# Patient Record
Sex: FEMALE | Race: WHITE | NOT HISPANIC OR LATINO
[De-identification: names, ages, dates, MRNs, and addresses within clinical notes are randomized per-mention and may not be internally consistent; named-entity substitution may affect disease eponyms.]

---

## 2021-01-18 ENCOUNTER — NON-APPOINTMENT (OUTPATIENT)
Age: 81
End: 2021-01-18

## 2021-01-18 ENCOUNTER — APPOINTMENT (OUTPATIENT)
Dept: CARDIOLOGY | Facility: CLINIC | Age: 81
End: 2021-01-18
Payer: MEDICARE

## 2021-01-18 ENCOUNTER — LABORATORY RESULT (OUTPATIENT)
Age: 81
End: 2021-01-18

## 2021-01-18 VITALS
WEIGHT: 150 LBS | BODY MASS INDEX: 27.6 KG/M2 | SYSTOLIC BLOOD PRESSURE: 180 MMHG | TEMPERATURE: 96.9 F | OXYGEN SATURATION: 99 % | HEART RATE: 65 BPM | HEIGHT: 62 IN | DIASTOLIC BLOOD PRESSURE: 73 MMHG | RESPIRATION RATE: 18 BRPM

## 2021-01-18 VITALS — SYSTOLIC BLOOD PRESSURE: 210 MMHG | DIASTOLIC BLOOD PRESSURE: 90 MMHG

## 2021-01-18 DIAGNOSIS — Z78.9 OTHER SPECIFIED HEALTH STATUS: ICD-10-CM

## 2021-01-18 DIAGNOSIS — R42 DIZZINESS AND GIDDINESS: ICD-10-CM

## 2021-01-18 DIAGNOSIS — Z00.00 ENCOUNTER FOR GENERAL ADULT MEDICAL EXAMINATION W/OUT ABNORMAL FINDINGS: ICD-10-CM

## 2021-01-18 PROCEDURE — 93000 ELECTROCARDIOGRAM COMPLETE: CPT

## 2021-01-18 PROCEDURE — 99205 OFFICE O/P NEW HI 60 MIN: CPT

## 2021-01-18 PROCEDURE — 93306 TTE W/DOPPLER COMPLETE: CPT

## 2021-01-18 RX ORDER — METOPROLOL SUCCINATE 25 MG/1
25 TABLET, EXTENDED RELEASE ORAL
Qty: 30 | Refills: 0 | Status: DISCONTINUED | COMMUNITY
Start: 2021-01-05 | End: 2021-01-18

## 2021-01-18 RX ORDER — DIAZEPAM 5 MG/1
5 TABLET ORAL
Qty: 60 | Refills: 0 | Status: ACTIVE | COMMUNITY
Start: 2021-01-05

## 2021-01-18 RX ORDER — LEVOTHYROXINE SODIUM 0.05 MG/1
50 TABLET ORAL
Qty: 90 | Refills: 0 | Status: ACTIVE | COMMUNITY
Start: 2020-11-09

## 2021-01-18 NOTE — PHYSICAL EXAM
[Normal Appearance] : normal appearance [General Appearance - Well Developed] : well developed [Well Groomed] : well groomed [General Appearance - Well Nourished] : well nourished [No Deformities] : no deformities [General Appearance - In No Acute Distress] : no acute distress [Eyelids - No Xanthelasma] : the eyelids demonstrated no xanthelasmas [Normal Conjunctiva] : the conjunctiva exhibited no abnormalities [Heart Rate And Rhythm] : heart rate and rhythm were normal [Heart Sounds] : normal S1 and S2 [Murmurs] : no murmurs present [Arterial Pulses Normal] : the arterial pulses were normal [Edema] : no peripheral edema present [Exaggerated Use Of Accessory Muscles For Inspiration] : no accessory muscle use [Respiration, Rhythm And Depth] : normal respiratory rhythm and effort [Auscultation Breath Sounds / Voice Sounds] : lungs were clear to auscultation bilaterally [Abdomen Soft] : soft [Abdomen Tenderness] : non-tender [Abdomen Mass (___ Cm)] : no abdominal mass palpated [Abnormal Walk] : normal gait [Gait - Sufficient For Exercise Testing] : the gait was sufficient for exercise testing [Nail Clubbing] : no clubbing of the fingernails [Petechial Hemorrhages (___cm)] : no petechial hemorrhages [Cyanosis, Localized] : no localized cyanosis [Skin Turgor] : normal skin turgor [] : no rash [Affect] : the affect was normal [Mood] : the mood was normal [FreeTextEntry1] : no bruits

## 2021-01-18 NOTE — REVIEW OF SYSTEMS
[Eyeglasses] : currently wearing eyeglasses [Dizziness] : dizziness [Negative] : Heme/Lymph [FreeTextEntry1] : falls

## 2021-01-18 NOTE — DISCUSSION/SUMMARY
[Patient] : the patient [Risks] : risks [Benefits] : benefits [Alternatives] : alternatives [___ Week(s)] : [unfilled] week(s) [FreeTextEntry1] : When bp controlled, consider for ischemia w/u as well.

## 2021-01-18 NOTE — ASSESSMENT
[FreeTextEntry1] : HBP labile with suboptimal control. r/o secondary hypertension\par LBBB\par Thyroid disease\par Anxiety

## 2021-01-18 NOTE — HISTORY OF PRESENT ILLNESS
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

## 2021-01-19 LAB
ALBUMIN SERPL ELPH-MCNC: 4.7 G/DL
ALDOSTERONE SERUM: 6.2 NG/DL
ALP BLD-CCNC: 74 U/L
ALT SERPL-CCNC: 19 U/L
ANION GAP SERPL CALC-SCNC: 14 MMOL/L
AST SERPL-CCNC: 26 U/L
BASOPHILS # BLD AUTO: 0.04 K/UL
BASOPHILS NFR BLD AUTO: 0.7 %
BILIRUB SERPL-MCNC: 0.8 MG/DL
BUN SERPL-MCNC: 12 MG/DL
CALCIUM SERPL-MCNC: 9.7 MG/DL
CHLORIDE SERPL-SCNC: 99 MMOL/L
CHOLEST SERPL-MCNC: 159 MG/DL
CO2 SERPL-SCNC: 26 MMOL/L
CREAT SERPL-MCNC: 0.67 MG/DL
EOSINOPHIL # BLD AUTO: 0.06 K/UL
EOSINOPHIL NFR BLD AUTO: 1 %
GLUCOSE SERPL-MCNC: 89 MG/DL
HCT VFR BLD CALC: 39.5 %
HDLC SERPL-MCNC: 77 MG/DL
HGB BLD-MCNC: 12.6 G/DL
IMM GRANULOCYTES NFR BLD AUTO: 0.5 %
LDLC SERPL CALC-MCNC: 71 MG/DL
LYMPHOCYTES # BLD AUTO: 2.16 K/UL
LYMPHOCYTES NFR BLD AUTO: 36.1 %
MAN DIFF?: NORMAL
MCHC RBC-ENTMCNC: 29.8 PG
MCHC RBC-ENTMCNC: 31.9 GM/DL
MCV RBC AUTO: 93.4 FL
MONOCYTES # BLD AUTO: 0.4 K/UL
MONOCYTES NFR BLD AUTO: 6.7 %
NEUTROPHILS # BLD AUTO: 3.3 K/UL
NEUTROPHILS NFR BLD AUTO: 55 %
NONHDLC SERPL-MCNC: 82 MG/DL
PLATELET # BLD AUTO: 205 K/UL
POTASSIUM SERPL-SCNC: 5 MMOL/L
PROT SERPL-MCNC: 7.6 G/DL
RBC # BLD: 4.23 M/UL
RBC # FLD: 12.6 %
SODIUM SERPL-SCNC: 139 MMOL/L
T3FREE SERPL-MCNC: 2.54 PG/ML
T4 FREE SERPL-MCNC: 1.5 NG/DL
TRIGL SERPL-MCNC: 55 MG/DL
TSH SERPL-ACNC: 1.12 UIU/ML
WBC # FLD AUTO: 5.99 K/UL

## 2021-01-20 LAB — VMA/CREAT UR: 6.3 MG/G CR

## 2021-01-24 LAB
CREAT ?TM UR-MCNC: 27 MG/DL
DOPAMINE/CREAT UR: 186 MCG/G CR
EPINEPH/CREAT UR: 19 MCG/G CR
EPINEPHRINE/NOEPINEPHRINE CALC TOTAL RAN: 125 MCG/G CR
NOREPINEPH/CREAT UR: 106 MCG/G CR

## 2021-01-24 RX ORDER — METOPROLOL SUCCINATE 25 MG/1
25 TABLET, EXTENDED RELEASE ORAL
Refills: 0 | Status: DISCONTINUED | COMMUNITY
End: 2021-01-24

## 2021-01-26 LAB — RENIN ACTIVITY, PLASMA: 0.49 NG/ML/HR

## 2021-02-09 ENCOUNTER — APPOINTMENT (OUTPATIENT)
Dept: CARDIOLOGY | Facility: CLINIC | Age: 81
End: 2021-02-09

## 2021-02-11 ENCOUNTER — APPOINTMENT (OUTPATIENT)
Dept: CARDIOLOGY | Facility: CLINIC | Age: 81
End: 2021-02-11

## 2021-02-23 ENCOUNTER — APPOINTMENT (OUTPATIENT)
Dept: CARDIOLOGY | Facility: CLINIC | Age: 81
End: 2021-02-23
Payer: MEDICARE

## 2021-02-23 PROCEDURE — 93975 VASCULAR STUDY: CPT

## 2021-03-01 ENCOUNTER — APPOINTMENT (OUTPATIENT)
Dept: CARDIOLOGY | Facility: CLINIC | Age: 81
End: 2021-03-01
Payer: MEDICARE

## 2021-03-01 ENCOUNTER — NON-APPOINTMENT (OUTPATIENT)
Age: 81
End: 2021-03-01

## 2021-03-01 VITALS
HEART RATE: 53 BPM | HEIGHT: 62 IN | TEMPERATURE: 97.5 F | DIASTOLIC BLOOD PRESSURE: 81 MMHG | BODY MASS INDEX: 27.6 KG/M2 | RESPIRATION RATE: 16 BRPM | OXYGEN SATURATION: 100 % | WEIGHT: 150 LBS | SYSTOLIC BLOOD PRESSURE: 154 MMHG

## 2021-03-01 PROCEDURE — 99214 OFFICE O/P EST MOD 30 MIN: CPT

## 2021-03-01 PROCEDURE — 93000 ELECTROCARDIOGRAM COMPLETE: CPT

## 2021-03-01 NOTE — DISCUSSION/SUMMARY
[Patient] : the patient [Risks] : risks [Benefits] : benefits [Alternatives] : alternatives [___ Week(s)] : [unfilled] week(s) [FreeTextEntry1] : Discussed extensively with patient reviewed lab studies renal artery duplex.  Will not increase beta-blocker related to resting bradycardia and good control of blood pressure on home measurements.  Continue same cardiac meds at this time.  Advised 24-hour urine for catecholamines.  Advised ischemia work-up but she declines at this time asymptomatic and physically active.  Follow with her PMD and may see me once or twice a year.

## 2021-03-01 NOTE — PHYSICAL EXAM
[General Appearance - Well Developed] : well developed [Normal Appearance] : normal appearance [Well Groomed] : well groomed [General Appearance - Well Nourished] : well nourished [No Deformities] : no deformities [General Appearance - In No Acute Distress] : no acute distress [Normal Conjunctiva] : the conjunctiva exhibited no abnormalities [Eyelids - No Xanthelasma] : the eyelids demonstrated no xanthelasmas [Respiration, Rhythm And Depth] : normal respiratory rhythm and effort [Exaggerated Use Of Accessory Muscles For Inspiration] : no accessory muscle use [Auscultation Breath Sounds / Voice Sounds] : lungs were clear to auscultation bilaterally [Heart Rate And Rhythm] : heart rate and rhythm were normal [Heart Sounds] : normal S1 and S2 [Murmurs] : no murmurs present [Arterial Pulses Normal] : the arterial pulses were normal [Edema] : no peripheral edema present [Abdomen Soft] : soft [Abdomen Tenderness] : non-tender [Abdomen Mass (___ Cm)] : no abdominal mass palpated [Abnormal Walk] : normal gait [Gait - Sufficient For Exercise Testing] : the gait was sufficient for exercise testing [Nail Clubbing] : no clubbing of the fingernails [Cyanosis, Localized] : no localized cyanosis [Petechial Hemorrhages (___cm)] : no petechial hemorrhages [Skin Turgor] : normal skin turgor [] : no rash [Affect] : the affect was normal [Mood] : the mood was normal [FreeTextEntry1] : no bruits

## 2021-03-01 NOTE — REVIEW OF SYSTEMS
[Eyeglasses] : currently wearing eyeglasses [Under Stress] : under stress [Negative] : Heme/Lymph [Dizziness] : no dizziness [FreeTextEntry1] : falls

## 2021-03-01 NOTE — ASSESSMENT
[FreeTextEntry1] : Improved blood pressure control.  Elevated catecholamine screening examination.  LBBB.

## 2021-05-13 ENCOUNTER — APPOINTMENT (OUTPATIENT)
Dept: CARDIOLOGY | Facility: CLINIC | Age: 81
End: 2021-05-13
Payer: MEDICARE

## 2021-05-13 VITALS
WEIGHT: 150 LBS | DIASTOLIC BLOOD PRESSURE: 86 MMHG | TEMPERATURE: 98 F | OXYGEN SATURATION: 100 % | BODY MASS INDEX: 27.6 KG/M2 | RESPIRATION RATE: 16 BRPM | SYSTOLIC BLOOD PRESSURE: 161 MMHG | HEIGHT: 62 IN | HEART RATE: 76 BPM

## 2021-05-13 VITALS — DIASTOLIC BLOOD PRESSURE: 86 MMHG | SYSTOLIC BLOOD PRESSURE: 146 MMHG

## 2021-05-13 PROCEDURE — 99213 OFFICE O/P EST LOW 20 MIN: CPT

## 2021-05-13 PROCEDURE — 93000 ELECTROCARDIOGRAM COMPLETE: CPT

## 2021-05-13 NOTE — PHYSICAL EXAM
[Well Developed] : well developed [Well Nourished] : well nourished [No Acute Distress] : no acute distress [Normal Conjunctiva] : normal conjunctiva [Normal Venous Pressure] : normal venous pressure [No Carotid Bruit] : no carotid bruit [Normal S1, S2] : normal S1, S2 [No Rub] : no rub [No Gallop] : no gallop [Normal] : clear lung fields, good air entry, no respiratory distress [Clear Lung Fields] : clear lung fields [Good Air Entry] : good air entry [No Respiratory Distress] : no respiratory distress  [Soft] : abdomen soft [Non Tender] : non-tender [No Masses/organomegaly] : no masses/organomegaly [Normal Bowel Sounds] : normal bowel sounds [Normal Gait] : normal gait [No Edema] : no edema [No Cyanosis] : no cyanosis [No Clubbing] : no clubbing [No Varicosities] : no varicosities [No Rash] : no rash [No Skin Lesions] : no skin lesions [Moves all extremities] : moves all extremities [No Focal Deficits] : no focal deficits [Normal Speech] : normal speech [Alert and Oriented] : alert and oriented [Normal memory] : normal memory [de-identified] : Soft systolic murmur at base

## 2021-05-13 NOTE — DISCUSSION/SUMMARY
[Patient] : the patient [Risks] : risks [Benefits] : benefits [Alternatives] : alternatives [___ Month(s)] : in [unfilled] month(s) [FreeTextEntry1] : Discussed with patient in detail as addressed.  Possible precautions and hydration discussed.  Continue current cardiac medicines.  Follow-up with me in 6 months earlier as needed.

## 2021-05-13 NOTE — HISTORY OF PRESENT ILLNESS
[FreeTextEntry1] : Follow-up visit.  Review of home blood pressure generally between the 120s and 130s systolic over 60s.  Has a little bit of postural lightheadedness at times.  No chest pain shortness of breath flushing palpitations.\par \par Reviewed urinary test results with her.

## 2021-07-18 ENCOUNTER — INPATIENT (INPATIENT)
Facility: HOSPITAL | Age: 81
LOS: 0 days | Discharge: HOME | DRG: 394 | End: 2021-07-19
Attending: STUDENT IN AN ORGANIZED HEALTH CARE EDUCATION/TRAINING PROGRAM | Admitting: STUDENT IN AN ORGANIZED HEALTH CARE EDUCATION/TRAINING PROGRAM
Payer: COMMERCIAL

## 2021-07-18 ENCOUNTER — TRANSCRIPTION ENCOUNTER (OUTPATIENT)
Age: 81
End: 2021-07-18

## 2021-07-18 VITALS
RESPIRATION RATE: 15 BRPM | TEMPERATURE: 98 F | DIASTOLIC BLOOD PRESSURE: 70 MMHG | SYSTOLIC BLOOD PRESSURE: 150 MMHG | HEART RATE: 89 BPM | WEIGHT: 147.93 LBS | HEIGHT: 62 IN | OXYGEN SATURATION: 97 %

## 2021-07-18 DIAGNOSIS — K35.80 UNSPECIFIED ACUTE APPENDICITIS: ICD-10-CM

## 2021-07-18 LAB
ALBUMIN SERPL ELPH-MCNC: 3.7 G/DL — SIGNIFICANT CHANGE UP (ref 3.3–5)
ALP SERPL-CCNC: 56 U/L — SIGNIFICANT CHANGE UP (ref 40–120)
ALT FLD-CCNC: 33 U/L — SIGNIFICANT CHANGE UP (ref 10–45)
ANION GAP SERPL CALC-SCNC: 3 MMOL/L — LOW (ref 5–17)
APPEARANCE UR: CLEAR — SIGNIFICANT CHANGE UP
AST SERPL-CCNC: 24 U/L — SIGNIFICANT CHANGE UP (ref 10–40)
BACTERIA # UR AUTO: NEGATIVE /HPF — SIGNIFICANT CHANGE UP
BASOPHILS # BLD AUTO: 0.02 K/UL — SIGNIFICANT CHANGE UP (ref 0–0.2)
BASOPHILS NFR BLD AUTO: 0.2 % — SIGNIFICANT CHANGE UP (ref 0–2)
BILIRUB SERPL-MCNC: 1.3 MG/DL — HIGH (ref 0.2–1.2)
BILIRUB UR-MCNC: NEGATIVE — SIGNIFICANT CHANGE UP
BLD GP AB SCN SERPL QL: SIGNIFICANT CHANGE UP
BUN SERPL-MCNC: 13 MG/DL — SIGNIFICANT CHANGE UP (ref 7–23)
CALCIUM SERPL-MCNC: 8.9 MG/DL — SIGNIFICANT CHANGE UP (ref 8.4–10.5)
CHLORIDE SERPL-SCNC: 95 MMOL/L — LOW (ref 96–108)
CO2 SERPL-SCNC: 31 MMOL/L — SIGNIFICANT CHANGE UP (ref 22–31)
COLOR SPEC: YELLOW — SIGNIFICANT CHANGE UP
CREAT SERPL-MCNC: 0.65 MG/DL — SIGNIFICANT CHANGE UP (ref 0.5–1.3)
DIFF PNL FLD: ABNORMAL
EOSINOPHIL # BLD AUTO: 0.01 K/UL — SIGNIFICANT CHANGE UP (ref 0–0.5)
EOSINOPHIL NFR BLD AUTO: 0.1 % — SIGNIFICANT CHANGE UP (ref 0–6)
EPI CELLS # UR: SIGNIFICANT CHANGE UP
GLUCOSE SERPL-MCNC: 117 MG/DL — HIGH (ref 70–99)
GLUCOSE UR QL: NEGATIVE — SIGNIFICANT CHANGE UP
HCT VFR BLD CALC: 34.5 % — SIGNIFICANT CHANGE UP (ref 34.5–45)
HGB BLD-MCNC: 11.9 G/DL — SIGNIFICANT CHANGE UP (ref 11.5–15.5)
IMM GRANULOCYTES NFR BLD AUTO: 0.4 % — SIGNIFICANT CHANGE UP (ref 0–1.5)
KETONES UR-MCNC: NEGATIVE — SIGNIFICANT CHANGE UP
LEUKOCYTE ESTERASE UR-ACNC: NEGATIVE — SIGNIFICANT CHANGE UP
LIDOCAIN IGE QN: 93 U/L — SIGNIFICANT CHANGE UP (ref 73–393)
LYMPHOCYTES # BLD AUTO: 1.62 K/UL — SIGNIFICANT CHANGE UP (ref 1–3.3)
LYMPHOCYTES # BLD AUTO: 13.9 % — SIGNIFICANT CHANGE UP (ref 13–44)
MCHC RBC-ENTMCNC: 30.7 PG — SIGNIFICANT CHANGE UP (ref 27–34)
MCHC RBC-ENTMCNC: 34.5 GM/DL — SIGNIFICANT CHANGE UP (ref 32–36)
MCV RBC AUTO: 88.9 FL — SIGNIFICANT CHANGE UP (ref 80–100)
MONOCYTES # BLD AUTO: 0.82 K/UL — SIGNIFICANT CHANGE UP (ref 0–0.9)
MONOCYTES NFR BLD AUTO: 7.1 % — SIGNIFICANT CHANGE UP (ref 2–14)
NEUTROPHILS # BLD AUTO: 9.11 K/UL — HIGH (ref 1.8–7.4)
NEUTROPHILS NFR BLD AUTO: 78.3 % — HIGH (ref 43–77)
NITRITE UR-MCNC: NEGATIVE — SIGNIFICANT CHANGE UP
NRBC # BLD: 0 /100 WBCS — SIGNIFICANT CHANGE UP (ref 0–0)
PH UR: 7 — SIGNIFICANT CHANGE UP (ref 5–8)
PLATELET # BLD AUTO: 168 K/UL — SIGNIFICANT CHANGE UP (ref 150–400)
POTASSIUM SERPL-MCNC: 3.8 MMOL/L — SIGNIFICANT CHANGE UP (ref 3.5–5.3)
POTASSIUM SERPL-SCNC: 3.8 MMOL/L — SIGNIFICANT CHANGE UP (ref 3.5–5.3)
PROT SERPL-MCNC: 7.6 G/DL — SIGNIFICANT CHANGE UP (ref 6–8.3)
PROT UR-MCNC: NEGATIVE — SIGNIFICANT CHANGE UP
RBC # BLD: 3.88 M/UL — SIGNIFICANT CHANGE UP (ref 3.8–5.2)
RBC # FLD: 12.5 % — SIGNIFICANT CHANGE UP (ref 10.3–14.5)
RBC CASTS # UR COMP ASSIST: SIGNIFICANT CHANGE UP /HPF (ref 0–4)
SARS-COV-2 RNA SPEC QL NAA+PROBE: SIGNIFICANT CHANGE UP
SODIUM SERPL-SCNC: 129 MMOL/L — LOW (ref 135–145)
SP GR SPEC: 1.01 — SIGNIFICANT CHANGE UP (ref 1.01–1.02)
UROBILINOGEN FLD QL: NEGATIVE — SIGNIFICANT CHANGE UP
WBC # BLD: 11.63 K/UL — HIGH (ref 3.8–10.5)
WBC # FLD AUTO: 11.63 K/UL — HIGH (ref 3.8–10.5)
WBC UR QL: NEGATIVE /HPF — SIGNIFICANT CHANGE UP (ref 0–5)

## 2021-07-18 PROCEDURE — 99285 EMERGENCY DEPT VISIT HI MDM: CPT

## 2021-07-18 PROCEDURE — 74177 CT ABD & PELVIS W/CONTRAST: CPT | Mod: 26,MA

## 2021-07-18 PROCEDURE — 99223 1ST HOSP IP/OBS HIGH 75: CPT

## 2021-07-18 PROCEDURE — 71045 X-RAY EXAM CHEST 1 VIEW: CPT | Mod: 26

## 2021-07-18 RX ORDER — SODIUM CHLORIDE 9 MG/ML
1000 INJECTION INTRAMUSCULAR; INTRAVENOUS; SUBCUTANEOUS ONCE
Refills: 0 | Status: COMPLETED | OUTPATIENT
Start: 2021-07-18 | End: 2021-07-18

## 2021-07-18 RX ORDER — SODIUM CHLORIDE 9 MG/ML
1000 INJECTION, SOLUTION INTRAVENOUS
Refills: 0 | Status: DISCONTINUED | OUTPATIENT
Start: 2021-07-18 | End: 2021-07-19

## 2021-07-18 RX ORDER — PIPERACILLIN AND TAZOBACTAM 4; .5 G/20ML; G/20ML
3.38 INJECTION, POWDER, LYOPHILIZED, FOR SOLUTION INTRAVENOUS ONCE
Refills: 0 | Status: COMPLETED | OUTPATIENT
Start: 2021-07-18 | End: 2021-07-18

## 2021-07-18 RX ORDER — LANOLIN ALCOHOL/MO/W.PET/CERES
3 CREAM (GRAM) TOPICAL AT BEDTIME
Refills: 0 | Status: DISCONTINUED | OUTPATIENT
Start: 2021-07-18 | End: 2021-07-19

## 2021-07-18 RX ORDER — ACETAMINOPHEN 500 MG
650 TABLET ORAL EVERY 6 HOURS
Refills: 0 | Status: DISCONTINUED | OUTPATIENT
Start: 2021-07-18 | End: 2021-07-19

## 2021-07-18 RX ORDER — PIPERACILLIN AND TAZOBACTAM 4; .5 G/20ML; G/20ML
3.38 INJECTION, POWDER, LYOPHILIZED, FOR SOLUTION INTRAVENOUS ONCE
Refills: 0 | Status: DISCONTINUED | OUTPATIENT
Start: 2021-07-18 | End: 2021-07-18

## 2021-07-18 RX ORDER — TRAMADOL HYDROCHLORIDE 50 MG/1
25 TABLET ORAL EVERY 6 HOURS
Refills: 0 | Status: DISCONTINUED | OUTPATIENT
Start: 2021-07-18 | End: 2021-07-19

## 2021-07-18 RX ORDER — SODIUM CHLORIDE 9 MG/ML
1000 INJECTION, SOLUTION INTRAVENOUS
Refills: 0 | Status: DISCONTINUED | OUTPATIENT
Start: 2021-07-18 | End: 2021-07-18

## 2021-07-18 RX ORDER — PIPERACILLIN AND TAZOBACTAM 4; .5 G/20ML; G/20ML
3.38 INJECTION, POWDER, LYOPHILIZED, FOR SOLUTION INTRAVENOUS EVERY 8 HOURS
Refills: 0 | Status: DISCONTINUED | OUTPATIENT
Start: 2021-07-18 | End: 2021-07-19

## 2021-07-18 RX ORDER — OXYCODONE AND ACETAMINOPHEN 5; 325 MG/1; MG/1
1 TABLET ORAL EVERY 8 HOURS
Refills: 0 | Status: DISCONTINUED | OUTPATIENT
Start: 2021-07-18 | End: 2021-07-19

## 2021-07-18 RX ORDER — ONDANSETRON 8 MG/1
4 TABLET, FILM COATED ORAL ONCE
Refills: 0 | Status: DISCONTINUED | OUTPATIENT
Start: 2021-07-18 | End: 2021-07-19

## 2021-07-18 RX ORDER — MORPHINE SULFATE 50 MG/1
4 CAPSULE, EXTENDED RELEASE ORAL ONCE
Refills: 0 | Status: DISCONTINUED | OUTPATIENT
Start: 2021-07-18 | End: 2021-07-18

## 2021-07-18 RX ADMIN — SODIUM CHLORIDE 60 MILLILITER(S): 9 INJECTION, SOLUTION INTRAVENOUS at 18:09

## 2021-07-18 RX ADMIN — PIPERACILLIN AND TAZOBACTAM 25 GRAM(S): 4; .5 INJECTION, POWDER, LYOPHILIZED, FOR SOLUTION INTRAVENOUS at 21:38

## 2021-07-18 RX ADMIN — SODIUM CHLORIDE 1000 MILLILITER(S): 9 INJECTION INTRAMUSCULAR; INTRAVENOUS; SUBCUTANEOUS at 14:09

## 2021-07-18 RX ADMIN — PIPERACILLIN AND TAZOBACTAM 200 GRAM(S): 4; .5 INJECTION, POWDER, LYOPHILIZED, FOR SOLUTION INTRAVENOUS at 15:15

## 2021-07-18 NOTE — CONSULT NOTE ADULT - TIME BILLING
All risk and options discussed; all lab values and imaging studies reviewed; discussed with Medicine

## 2021-07-18 NOTE — H&P ADULT - NSHPPHYSICALEXAM_GEN_ALL_CORE
T(C): 36.7 (07-18-21 @ 13:05), Max: 36.7 (07-18-21 @ 13:05)  HR: 89 (07-18-21 @ 13:05) (89 - 89)  BP: 150/70 (07-18-21 @ 13:05) (150/70 - 150/70)  RR: 15 (07-18-21 @ 13:05) (15 - 15)  SpO2: 97% (07-18-21 @ 13:05) (97% - 97%)  Wt(kg): --Vital Signs Last 24 Hrs    PHYSICAL EXAM:  GENERAL: NAD, well-groomed, well-developed. Pleasant.   NERVOUS SYSTEM:  Alert & Oriented X3, Good concentration; Motor Strength 5/5 B/L upper and lower extremities; DTRs 2+ intact and symmetric  HEAD:  Atraumatic, Normocephalic  EYES: EOMI, PERRLA, conjunctiva and sclera clear  ENMT: No tonsillar erythema, exudates, or enlargement; Moist mucous membranes, Good dentition, No lesions  NECK: Supple, No JVD, Normal thyroid  CHEST/LUNG: Clear to percussion bilaterally; No rales, rhonchi, wheezing, or rubs  HEART: Regular rate and rhythm; No murmurs, rubs, or gallops  ABDOMEN: Soft, Nontender, Nondistended; Bowel sounds present  EXTREMITIES:  2+ Peripheral Pulses, No clubbing, cyanosis, or edema  LYMPH: No lymphadenopathy noted  SKIN: No rashes or lesions

## 2021-07-18 NOTE — ED PROVIDER NOTE - PROGRESS NOTE DETAILS
MEJIA Buckner- Acute appendicitis- Zosyn and fluids given. Patient NPO. Dr. Bro made aware. Admitted to Dr. Prince

## 2021-07-18 NOTE — H&P ADULT - NSHPREVIEWOFSYSTEMS_GEN_ALL_CORE
REVIEW OF SYSTEMS:  CONSTITUTIONAL: No fever, weight loss, or fatigue  EYES: No eye pain, visual disturbances, or discharge  ENMT:  No difficulty hearing, tinnitus, vertigo; No sinus or throat pain  NECK: No pain or stiffness  BREASTS: No pain, masses, or nipple discharge  RESPIRATORY: No cough, wheezing, chills or hemoptysis; No shortness of breath  CARDIOVASCULAR: No chest pain, palpitations, dizziness, or leg swelling  GASTROINTESTINAL: +abdominal pain +nausea & vomiting; No diarrhea or constipation. No melena or hematochezia.  GENITOURINARY: No dysuria, frequency, hematuria, or incontinence  NEUROLOGICAL: No headaches, memory loss, loss of strength, numbness, or tremors  SKIN: No itching, burning, rashes, or lesions   LYMPH NODES: No enlarged glands  ENDOCRINE: No heat or cold intolerance; No hair loss  MUSCULOSKELETAL: No joint pain or swelling; No muscle, back, or extremity pain  PSYCHIATRIC: No depression, anxiety, mood swings, or difficulty sleeping  HEME/LYMPH: No easy bruising, or bleeding gums  ALLERGY AND IMMUNOLOGIC: No hives or eczema    ALL ROS REVIEWED AND NORMAL EXCEPT AS STATED ABOVE

## 2021-07-18 NOTE — CONSULT NOTE ADULT - SUBJECTIVE AND OBJECTIVE BOX
This 80 year old woman developed generalized abdominal pain two days ago. Yesterday, the pain persisted, and she vomited multiple times. The patient denied fever, chills, nor previous similar symptoms. A CT scan in the ED revealed "acute, uncomplicated appendicitis". 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      PAST MEDICAL & SURGICAL HISTORY:  HTN    No pertinent surgical procedures.    PFSH: All noncontributory    MEDICATIONS REVIEWED:acetaminophen   Tablet .. 650 milliGRAM(s) Oral every 6 hours PRN  aluminum hydroxide/magnesium hydroxide/simethicone Suspension 30 milliLiter(s) Oral every 4 hours PRN  dextrose 5% + sodium chloride 0.9%. 1000 milliLiter(s) IV Continuous <Continuous>  melatonin 3 milliGRAM(s) Oral at bedtime PRN  ondansetron Injectable 4 milliGRAM(s) IV Push once  oxycodone    5 mG/acetaminophen 325 mG 1 Tablet(s) Oral every 8 hours PRN  piperacillin/tazobactam IVPB.. 3.375 Gram(s) IV Intermittent every 8 hours  traMADol 25 milliGRAM(s) Oral every 6 hours PRN      ALLERGIES:No Known Allergies      REVIEW OF SYSTEMS:  Comprehensive Review of Systems negative except as noted in HPI      PHYSICAL EXAMINATION:  RESPIRATORY: Clear to auscultation bilaterally, respirations non-labored.  CARDIAC: RR, normal S1S2, no murmurs.  ABDOMEN: Minimal tenderness to deep palpation in RLQ, soft, BS present, no masses, no hernias, no peritoneal signs, no KLS  VASCULAR: Equal and normal pulses.  MUSCULOSKELETAL: Full ROM, no deformities.      T(C): 36.6 (07-18-21 @ 17:53), Max: 36.7 (07-18-21 @ 13:05)  HR: 75 (07-18-21 @ 17:53) (75 - 89)  BP: 135/62 (07-18-21 @ 17:53) (135/62 - 150/70)  RR: 16 (07-18-21 @ 17:53) (15 - 16)  SpO2: 100% (07-18-21 @ 17:53) (97% - 100%)                          11.9   11.63 )-----------( 168      ( 18 Jul 2021 13:50 )             34.5       07-18    129<L>  |  95<L>  |  13  ----------------------------<  117<H>  3.8   |  31  |  0.65    Ca    8.9      18 Jul 2021 13:50    TPro  7.6  /  Alb  3.7  /  TBili  1.3<H>  /  DBili  x   /  AST  24  /  ALT  33  /  AlkPhos  56  07-18

## 2021-07-18 NOTE — ED ADULT NURSE REASSESSMENT NOTE - NS ED NURSE REASSESS COMMENT FT1
Pt offered medication as per MD order, Zofran and Morphine, and patient refused all medications stating "I don't need those, at least not right not". Medications returned to pixis and will recheck pt pain level and need for medication.

## 2021-07-18 NOTE — ED PROVIDER NOTE - CLINICAL SUMMARY MEDICAL DECISION MAKING FREE TEXT BOX
80 y.o F with h/o HTN pw RLQ abdominal pain, NBNB n/v x 3 episodes, low grade fever Tmax oral 100 and chills starting 1.5 days ago. States she ate pizza and a calzone followed later that night by abdominal distention and belching and then n/v, fever yesterday. Denies diarrhea, chest pain, shortness of breath, cough, dysuria, hematuria, weakness. Took no meds PTA  No surgical hx  Plan: Will obtain basic labs with lipase, check CT Ab/pelvis, give fluids, antiemetics, pain meds and reassess 80 y.o F with h/o HTN pw RLQ abdominal pain, NBNB n/v x 3 episodes, low grade fever Tmax oral 100 and chills starting 1.5 days ago. States she ate pizza and a calzone followed later that night by abdominal distention and belching and then n/v, fever yesterday. Denies diarrhea, chest pain, shortness of breath, cough, dysuria, hematuria, weakness. Took no meds PTA  No surgical hx  Plan: Will obtain basic labs with lipase, check CT Ab/pelvis, give fluids, antiemetics, pain meds and reassess  Update @1520: Acute appendicitis- Zosyn and fluids given. Patient NPO. Dr. Bro made aware. Admitted to Dr. Prince

## 2021-07-18 NOTE — H&P ADULT - NSHPLABSRESULTS_GEN_ALL_CORE
LABS:                        11.9   11.63 )-----------( 168      ( 2021 13:50 )             34.5     07-18    129<L>  |  95<L>  |  13  ----------------------------<  117<H>  3.8   |  31  |  0.65    Ca    8.9      2021 13:50    TPro  7.6  /  Alb  3.7  /  TBili  1.3<H>  /  DBili  x   /  AST  24  /  ALT  33  /  AlkPhos  56  07-18      Urinalysis Basic - ( 2021 14:50 )    Color: Yellow / Appearance: Clear / S.010 / pH: x  Gluc: x / Ketone: Negative  / Bili: Negative / Urobili: Negative   Blood: x / Protein: Negative / Nitrite: Negative   Leuk Esterase: Negative / RBC: 0-4 /HPF / WBC Negative /HPF   Sq Epi: x / Non Sq Epi: Neg.-Few / Bacteria: Negative /HPF    CAPILLARY BLOOD GLUCOSE    RADIOLOGY & ADDITIONAL TESTS:    Consultant(s) Notes Reviewed:  [ ] YES  [ ] NO  Care Discussed with Consultants/Other Providers [ x] YES  [ ] NO d/w Jacquelyn SMITH PA  Imaging Personally Reviewed:  [ X ] YES  [ ] NO  Chest X-Ray (personally reviewed by me): Portable film, fair inspiratory effort. no acute pulm disease

## 2021-07-18 NOTE — ED PROVIDER NOTE - CARE PLAN
Principal Discharge DX:	Right lower quadrant abdominal pain   Principal Discharge DX:	Acute appendicitis, unspecified acute appendicitis type

## 2021-07-18 NOTE — ED PROVIDER NOTE - OBJECTIVE STATEMENT
80 y.o F with h/o HTN pw RLQ abdominal pain, NBNB n/v x 3 episodes, low grade fever Tmax oral 100 and chills starting 1.5 days ago. States she ate pizza and a calzone followed later that night by abdominal distention and belching and then n/v, fever yesterday. Denies chest pain, shortness of breath, cough, diarrhea, weakness. Took no meds PTA  No surgical hx

## 2021-07-18 NOTE — ED ADULT NURSE NOTE - NSIMPLEMENTINTERV_GEN_ALL_ED
Implemented All Universal Safety Interventions:  [place] to call system. Call bell, personal items and telephone within reach. Instruct patient to call for assistance. Room bathroom lighting operational. Non-slip footwear when patient is off stretcher. Physically safe environment: no spills, clutter or unnecessary equipment. Stretcher in lowest position, wheels locked, appropriate side rails in place.

## 2021-07-18 NOTE — H&P ADULT - HISTORY OF PRESENT ILLNESS
BENNETT ARITA is a 81y year old Female with PMH of Hypertension who presents to the ER complaining of abdominal pain for 2 days.    Patient states she had pizza for dinner Friday night, that evening had abdominal distention and gas. The next morning, she took her temp and was 100, had abdominal pain at lower quadrants of abdomen. Associated with nausea and vomiting x3, NBNB. Denies fever, chills, chest pain, shortness of breath.    In ER, vitals stable. CT notable for acute appendicitis. Received 1L NS, Zosyn.

## 2021-07-18 NOTE — H&P ADULT - ASSESSMENT
81y year old Female with PMH of Hypertension who presents to the ER complaining of abdominal pain for 2 days. Vitals stable. PE unremarkable. Labs notable for leukocytosis, hyponatremia. Imaging as above. Admitted for acute appendicitis.     # Acute Appendicitis   - Surgery consult - pt prefers non-surgical treatment for now. Dr. Bro evaluated pt  - follow leukocytosis and clinical status  - Zosyn  - analgesics, antiemetics prn  - NPO for now    # Hyponatremia   - IV hydration     # Hypertension   - hold home labetalol 50mg bid and norvasc 2.5mg bid    DVT Prophylaxis:  - IPCD for now given possibility of surgery     Admission Orders:  Vitals q8h  Diet: NPO  Activity: OOB with assistance as tolerated. PT/OT as tolerated  Precautions: Aspiration, Fall  Code status: Full Code    IMPROVE VTE Individual Risk Assessment          RISK                                                          Points  [  ] Previous VTE                                                3  [  ] Thrombophilia                                             2  [  ] Lower limb paralysis                                   2        (unable to hold up >15 seconds)    [  ] Current Cancer                                             2         (within 6 months)  [  ] Immobilization > 24 hrs                              1  [  ] ICU/CCU stay > 24 hours                             1  [ X ] Age > 60                                                         1    IMPROVE VTE Score:         [     1    ] 81y year old Female with PMH of Hypertension who presents to the ER complaining of abdominal pain for 2 days. Vitals stable. PE unremarkable. Labs notable for leukocytosis, hyponatremia. Imaging as above. Admitted for acute appendicitis.     # Acute Appendicitis   - Surgery consult - pt prefers non-surgical treatment for now. Dr. Bro evaluated pt  - follow leukocytosis and clinical status  - Zosyn  - analgesics, antiemetics prn  - NPO for now    Revised Cardiac Risk Assessment   [  ]History of ischemic heart disease   [  ]History of congestive heart failure   [  ]History of cerebrovascular disease (stroke or transient ischemic attack)   [  ]History of diabetes requiring preoperative insulin use   [  ]Chronic kidney disease (creatinine > 2 mg/dL)   [  ]Undergoing suprainguinal vascular, intraperitoneal, or intrathoracic surgery     0 predictors = 0.4%, 1 predictor = 1.0%, 2 predictors = 2.4%, > 3 predictors = 5.4%  * Overall this patient has a  0.4   % risk of cardiac death, nonfatal myocardial infarction, and nonfatal cardiac arrest perioperatively.   Patient does not have any known history of severe valvular disease and is not in decompensated CHF. No recent MI. Baseline functional capacity is > 4 METS. Patient is currently hemodynamically stable. Patient is medically optimized at this time and understands the inherent risks of surgery.    # Hyponatremia   - IV hydration     # Hypertension   - hold home labetalol 50mg bid and norvasc 2.5mg bid    DVT Prophylaxis:  - IPCD for now given possibility of surgery     Admission Orders:  Vitals q8h  Diet: NPO  Activity: OOB with assistance as tolerated. PT/OT as tolerated  Precautions: Aspiration, Fall  Code status: Full Code    IMPROVE VTE Individual Risk Assessment          RISK                                                          Points  [  ] Previous VTE                                                3  [  ] Thrombophilia                                             2  [  ] Lower limb paralysis                                   2        (unable to hold up >15 seconds)    [  ] Current Cancer                                             2         (within 6 months)  [  ] Immobilization > 24 hrs                              1  [  ] ICU/CCU stay > 24 hours                             1  [ X ] Age > 60                                                         1    IMPROVE VTE Score:         [     1    ]

## 2021-07-18 NOTE — ED PROVIDER NOTE - ATTENDING CONTRIBUTION TO CARE
Regine with MEJIA Valladares. 80 y.o F with h/o HTN pw RLQ abdominal pain, NBNB n/v x 3 episodes, low grade fever Tmax oral 100 and chills starting 1.5 days ago. States she ate pizza and a calzone followed later that night by abdominal distention and belching and then n/v, fever yesterday. Denies diarrhea, chest pain, shortness of breath, cough, dysuria, hematuria, weakness. Took no meds PTA  No surgical hx  Plan: Will obtain basic labs with lipase, check CT Ab/pelvis, give fluids, antiemetics, pain meds and reassess  Update @1520: Acute appendicitis- Zosyn and fluids given. Patient NPO. Dr. Bro made aware. Admitted to Dr. Prince    I performed a face to face bedside interview with patient regarding history of present illness, review of symptoms and past medical history. I completed an independent physical exam.  I have discussed the patient's plan of care with Physician Assistant (PA). I agree with note as stated above, having amended the EMR as needed to reflect my findings.   This includes History of Present Illness, HIV, Past Medical/Surgical/Family/Social History, Allergies and Home Medications, Review of Systems, Physical Exam, and any Progress Notes during the time I functioned as the attending physician for this patient.

## 2021-07-18 NOTE — CONSULT NOTE ADULT - ASSESSMENT
IMPRESSION: Acute, uncomplicated appendicitis    PLAN: All options and risks explained to the patient. She prefers to be treated nonoperatively and to reevaluate the situation in the morning             NPO, ALPESH

## 2021-07-19 ENCOUNTER — TRANSCRIPTION ENCOUNTER (OUTPATIENT)
Age: 81
End: 2021-07-19

## 2021-07-19 VITALS
OXYGEN SATURATION: 97 % | HEART RATE: 69 BPM | DIASTOLIC BLOOD PRESSURE: 72 MMHG | SYSTOLIC BLOOD PRESSURE: 125 MMHG | TEMPERATURE: 98 F | RESPIRATION RATE: 14 BRPM

## 2021-07-19 LAB
ALBUMIN SERPL ELPH-MCNC: 3.1 G/DL — LOW (ref 3.3–5)
ALP SERPL-CCNC: 46 U/L — SIGNIFICANT CHANGE UP (ref 40–120)
ALT FLD-CCNC: 27 U/L — SIGNIFICANT CHANGE UP (ref 10–45)
ANION GAP SERPL CALC-SCNC: 6 MMOL/L — SIGNIFICANT CHANGE UP (ref 5–17)
APTT BLD: 28.6 SEC — SIGNIFICANT CHANGE UP (ref 27.5–35.5)
AST SERPL-CCNC: 23 U/L — SIGNIFICANT CHANGE UP (ref 10–40)
BILIRUB SERPL-MCNC: 0.9 MG/DL — SIGNIFICANT CHANGE UP (ref 0.2–1.2)
BUN SERPL-MCNC: 11 MG/DL — SIGNIFICANT CHANGE UP (ref 7–23)
CALCIUM SERPL-MCNC: 8.2 MG/DL — LOW (ref 8.4–10.5)
CHLORIDE SERPL-SCNC: 105 MMOL/L — SIGNIFICANT CHANGE UP (ref 96–108)
CO2 SERPL-SCNC: 29 MMOL/L — SIGNIFICANT CHANGE UP (ref 22–31)
COVID-19 SPIKE DOMAIN AB INTERP: POSITIVE
COVID-19 SPIKE DOMAIN ANTIBODY RESULT: >250 U/ML — HIGH
CREAT SERPL-MCNC: 0.67 MG/DL — SIGNIFICANT CHANGE UP (ref 0.5–1.3)
CULTURE RESULTS: SIGNIFICANT CHANGE UP
GLUCOSE SERPL-MCNC: 97 MG/DL — SIGNIFICANT CHANGE UP (ref 70–99)
HCT VFR BLD CALC: 33.8 % — LOW (ref 34.5–45)
HGB BLD-MCNC: 11.3 G/DL — LOW (ref 11.5–15.5)
INR BLD: 1.15 RATIO — SIGNIFICANT CHANGE UP (ref 0.88–1.16)
MAGNESIUM SERPL-MCNC: 2.1 MG/DL — SIGNIFICANT CHANGE UP (ref 1.6–2.6)
MCHC RBC-ENTMCNC: 30.5 PG — SIGNIFICANT CHANGE UP (ref 27–34)
MCHC RBC-ENTMCNC: 33.4 GM/DL — SIGNIFICANT CHANGE UP (ref 32–36)
MCV RBC AUTO: 91.1 FL — SIGNIFICANT CHANGE UP (ref 80–100)
NRBC # BLD: 0 /100 WBCS — SIGNIFICANT CHANGE UP (ref 0–0)
PHOSPHATE SERPL-MCNC: 3 MG/DL — SIGNIFICANT CHANGE UP (ref 2.5–4.5)
PLATELET # BLD AUTO: 158 K/UL — SIGNIFICANT CHANGE UP (ref 150–400)
POTASSIUM SERPL-MCNC: 3.7 MMOL/L — SIGNIFICANT CHANGE UP (ref 3.5–5.3)
POTASSIUM SERPL-SCNC: 3.7 MMOL/L — SIGNIFICANT CHANGE UP (ref 3.5–5.3)
PROT SERPL-MCNC: 6.7 G/DL — SIGNIFICANT CHANGE UP (ref 6–8.3)
PROTHROM AB SERPL-ACNC: 13.8 SEC — HIGH (ref 10.6–13.6)
RBC # BLD: 3.71 M/UL — LOW (ref 3.8–5.2)
RBC # FLD: 12.8 % — SIGNIFICANT CHANGE UP (ref 10.3–14.5)
SARS-COV-2 IGG+IGM SERPL QL IA: >250 U/ML — HIGH
SARS-COV-2 IGG+IGM SERPL QL IA: POSITIVE
SODIUM SERPL-SCNC: 140 MMOL/L — SIGNIFICANT CHANGE UP (ref 135–145)
SPECIMEN SOURCE: SIGNIFICANT CHANGE UP
WBC # BLD: 7.36 K/UL — SIGNIFICANT CHANGE UP (ref 3.8–10.5)
WBC # FLD AUTO: 7.36 K/UL — SIGNIFICANT CHANGE UP (ref 3.8–10.5)

## 2021-07-19 PROCEDURE — 85025 COMPLETE CBC W/AUTO DIFF WBC: CPT

## 2021-07-19 PROCEDURE — 87635 SARS-COV-2 COVID-19 AMP PRB: CPT

## 2021-07-19 PROCEDURE — 86850 RBC ANTIBODY SCREEN: CPT

## 2021-07-19 PROCEDURE — 84100 ASSAY OF PHOSPHORUS: CPT

## 2021-07-19 PROCEDURE — 85027 COMPLETE CBC AUTOMATED: CPT

## 2021-07-19 PROCEDURE — 74177 CT ABD & PELVIS W/CONTRAST: CPT | Mod: MA

## 2021-07-19 PROCEDURE — 36415 COLL VENOUS BLD VENIPUNCTURE: CPT

## 2021-07-19 PROCEDURE — 85610 PROTHROMBIN TIME: CPT

## 2021-07-19 PROCEDURE — 83735 ASSAY OF MAGNESIUM: CPT

## 2021-07-19 PROCEDURE — 83690 ASSAY OF LIPASE: CPT

## 2021-07-19 PROCEDURE — 99285 EMERGENCY DEPT VISIT HI MDM: CPT | Mod: 25

## 2021-07-19 PROCEDURE — 99233 SBSQ HOSP IP/OBS HIGH 50: CPT

## 2021-07-19 PROCEDURE — 99239 HOSP IP/OBS DSCHRG MGMT >30: CPT

## 2021-07-19 PROCEDURE — 87086 URINE CULTURE/COLONY COUNT: CPT

## 2021-07-19 PROCEDURE — 86900 BLOOD TYPING SEROLOGIC ABO: CPT

## 2021-07-19 PROCEDURE — 80053 COMPREHEN METABOLIC PANEL: CPT

## 2021-07-19 PROCEDURE — 81001 URINALYSIS AUTO W/SCOPE: CPT

## 2021-07-19 PROCEDURE — 86901 BLOOD TYPING SEROLOGIC RH(D): CPT

## 2021-07-19 PROCEDURE — 96374 THER/PROPH/DIAG INJ IV PUSH: CPT

## 2021-07-19 PROCEDURE — 85730 THROMBOPLASTIN TIME PARTIAL: CPT

## 2021-07-19 PROCEDURE — 71045 X-RAY EXAM CHEST 1 VIEW: CPT

## 2021-07-19 PROCEDURE — 86769 SARS-COV-2 COVID-19 ANTIBODY: CPT

## 2021-07-19 RX ORDER — CALCIUM CARBONATE 500(1250)
1 TABLET ORAL ONCE
Refills: 0 | Status: COMPLETED | OUTPATIENT
Start: 2021-07-19 | End: 2021-07-19

## 2021-07-19 RX ADMIN — PIPERACILLIN AND TAZOBACTAM 25 GRAM(S): 4; .5 INJECTION, POWDER, LYOPHILIZED, FOR SOLUTION INTRAVENOUS at 05:30

## 2021-07-19 RX ADMIN — Medication 1 TABLET(S): at 00:41

## 2021-07-19 NOTE — PROGRESS NOTE ADULT - SUBJECTIVE AND OBJECTIVE BOX
Patient is a 81y old  Female who presents with a chief complaint of abdominal pain - appendicitis (2021 18:28)    BRIEF HOSPITAL COURSE:   BENNETT ARITA is a 81y year old Female with PMH of Hypertension who presents to the ER complaining of abdominal pain for 2 days. Patient states she had pizza for dinner Friday night, that evening had abdominal distention and gas. The next morning, she took her temp and was 100, had abdominal pain at lower quadrants of abdomen. Associated with nausea and vomiting x3, NBNB. Denies fever, chills, chest pain, shortness of breath. ()    Events last 24 hours:   - Evaluated patient at bedside  - Patient states pain has decreased and is doing better  - Patient prefers not to be surgically managed if possible  - Surgery on board    REVIEW OF SYSTEMS:  CONSTITUTIONAL: No fever, chills, or fatigue  EYES: No eye pain, visual disturbances, or discharge  ENMT:  No difficulty hearing, tinnitus, vertigo; No sinus or throat pain  NECK: No pain or stiffness  RESPIRATORY: No cough, wheezing, chills or hemoptysis; No shortness of breath  CARDIOVASCULAR: No chest pain, palpitations, dizziness, or leg swelling  GASTROINTESTINAL: No abdominal or epigastric pain. No nausea, vomiting, or hematemesis; No diarrhea or constipation. No melena or hematochezia.  GENITOURINARY: No dysuria, frequency, hematuria, or incontinence  NEUROLOGICAL: No headaches, memory loss, loss of strength, numbness, or tremors  SKIN: No itching, burning, rashes, or lesions   MUSCULOSKELETAL: No joint pain or swelling; No muscle, back, or extremity pain  PSYCHIATRIC: No depression, anxiety, mood swings, or difficulty sleeping    PAST MEDICAL & SURGICAL HISTORY:  HTN - Hypertension  Adult hypothyroidism  Hypothyroid  Essential hypertension, benign  HTN - Hypertension    Medications:  piperacillin/tazobactam IVPB.. 3.375 Gram(s) IV Intermittent every 8 hours  acetaminophen   Tablet .. 650 milliGRAM(s) Oral every 6 hours PRN  melatonin 3 milliGRAM(s) Oral at bedtime PRN  ondansetron Injectable 4 milliGRAM(s) IV Push once  oxycodone    5 mG/acetaminophen 325 mG 1 Tablet(s) Oral every 8 hours PRN  traMADol 25 milliGRAM(s) Oral every 6 hours PRN  aluminum hydroxide/magnesium hydroxide/simethicone Suspension 30 milliLiter(s) Oral every 4 hours PRN  dextrose 5% + sodium chloride 0.9%. 1000 milliLiter(s) IV Continuous <Continuous>    ICU Vital Signs Last 24 Hrs  T(C): 36.8 (2021 19:17), Max: 36.8 (2021 19:17)  T(F): 98.3 (2021 19:17), Max: 98.3 (2021 19:17)  HR: 77 (2021 19:17) (75 - 89)  BP: 134/63 (2021 19:17) (134/63 - 150/70)  RR: 16 (:17) (15 - 16)  SpO2: 97% (2021 19:17) (97% - 100%)    I&O's Detail    2021 07:01  -  2021 20:48  --------------------------------------------------------  IN:    dextrose 5% + sodium chloride 0.9%: 120 mL  Total IN: 120 mL    OUT:  Total OUT: 0 mL    Total NET: 120 mL    LABS:                        11.9   11.63 )-----------( 168      ( 2021 13:50 )             34.5     07-18    129<L>  |  95<L>  |  13  ----------------------------<  117<H>  3.8   |  31  |  0.65    Ca    8.9      2021 13:50    TPro  7.6  /  Alb  3.7  /  TBili  1.3<H>  /  DBili  x   /  AST  24  /  ALT  33  /  AlkPhos  56  07-18    Urinalysis Basic - ( 2021 14:50 )  Color: Yellow / Appearance: Clear / S.010 / pH: x  Gluc: x / Ketone: Negative  / Bili: Negative / Urobili: Negative   Blood: x / Protein: Negative / Nitrite: Negative   Leuk Esterase: Negative / RBC: 0-4 /HPF / WBC Negative /HPF   Sq Epi: x / Non Sq Epi: Neg.-Few / Bacteria: Negative /HPF    Physical Examination:    General: No acute distress.      HEENT: Pupils equal, reactive to light.  Symmetric.    PULM: Clear to auscultation bilaterally, no significant sputum production    NECK: Supple, no lymphadenopathy, trachea midline    CVS: Regular rate and rhythm, no murmurs, rubs, or gallops    ABD: Soft, nondistended, nontender, normoactive bowel sounds, no masses    EXT: No edema, nontender    SKIN: Warm and well perfused, no rashes noted.    NEURO: Alert, oriented, interactive, nonfocal    RADIOLOGY:   < from: CT Abdomen and Pelvis w/ IV Cont (21 @ 14:45) >    EXAM:  CT ABDOMEN AND PELVIS IC      PROCEDURE DATE:  2021      INTERPRETATION:  CLINICAL INFORMATION: Right lower quadrant abdominal pain, nausea and vomiting, and fever    COMPARISON: None.    CONTRAST/COMPLICATIONS:  IV Contrast: Omnipaque 350  90 cc administered   10 cc discarded  Oral Contrast: NONE  Complications: None reported at time of study completion    PROCEDURE:  CT of the Abdomen and Pelvis was performed.  Sagittal and coronal reformats were performed.    FINDINGS:  LOWER CHEST: Within normal limits.    LIVER: Left hepatic lobe cysts  BILE DUCTS: Normal caliber.  GALLBLADDER: Within normal limits.  SPLEEN: Within normal limits.  PANCREAS: Within normal limits.  ADRENALS: Within normal limits.  KIDNEYS/URETERS: 6.6 cm left lower pole renal cyst.    BLADDER: Within normal limits.  REPRODUCTIVE ORGANS: Hysterectomy. Ovaries are present and are unremarkable.    BOWEL: No bowel obstruction. Appendix is dilated, thick-walled and abnormally enhancing. Periappendiceal fat stranding without abscess or fluid collection. No extraluminal gas.  PERITONEUM: No ascites or pneumoperitoneum.  VESSELS: Within normal limits.  RETROPERITONEUM/LYMPH NODES: No lymphadenopathy.  ABDOMINAL WALL: Within normal limits.  BONES: Within normal limits.    IMPRESSION:  Acute uncomplicated appendicitis    --- End of Report ---    PHILLIP VILLALOBOS MD; Attending Radiologist  This document has been electronically signed. 2021  2:56PM    < end of copied text >    
Patient is a 81y old  Female who presents with a chief complaint of abdominal pain - appendicitis (2021 08:46)    Patient seen and examined at bedside.  no events overnight    ALLERGIES:  No Known Allergies        Vital Signs Last 24 Hrs  T(F): 98.1 (2021 08:32), Max: 99.4 (2021 05:13)  HR: 69 (2021 08:32) (66 - 77)  BP: 125/72 (2021 08:32) (114/58 - 142/60)  RR: 14 (2021 08:32) (14 - 18)  SpO2: 97% (2021 08:32) (96% - 100%)  I&O's Summary    2021 07:01  -  2021 07:00  --------------------------------------------------------  IN: 120 mL / OUT: 1 mL / NET: 119 mL      MEDICATIONS:  acetaminophen   Tablet .. 650 milliGRAM(s) Oral every 6 hours PRN  aluminum hydroxide/magnesium hydroxide/simethicone Suspension 30 milliLiter(s) Oral every 4 hours PRN  dextrose 5% + sodium chloride 0.9%. 1000 milliLiter(s) IV Continuous <Continuous>  melatonin 3 milliGRAM(s) Oral at bedtime PRN  ondansetron Injectable 4 milliGRAM(s) IV Push once  oxycodone    5 mG/acetaminophen 325 mG 1 Tablet(s) Oral every 8 hours PRN  piperacillin/tazobactam IVPB.. 3.375 Gram(s) IV Intermittent every 8 hours  traMADol 25 milliGRAM(s) Oral every 6 hours PRN      PHYSICAL EXAM:  General: NAD, A/O x 3  ENT: MMM, no thrush  Neck: Supple, No JVD  Lungs: Clear to auscultation bilaterally, non labored breathing  Cardio: S1S2 regular  Abdomen: Soft, Nontender  Extremities: No cyanosis, No edema    LABS:                        11.3   7.36  )-----------( 158      ( 2021 07:10 )             33.8     07-    140  |  105  |  11  ----------------------------<  97  3.7   |  29  |  0.67    Ca    8.2      2021 07:10  Phos  3.0       Mg     2.1         TPro  6.7  /  Alb  3.1  /  TBili  0.9  /  DBili  x   /  AST  23  /  ALT  27  /  AlkPhos  46      eGFR if Non African American: 84 mL/min/1.73M2 (21 @ 07:10)  eGFR if African American: 97 mL/min/1.73M2 (21 @ 07:10)    PT/INR - ( 2021 07:10 )   PT: 13.8 sec;   INR: 1.15 ratio         PTT - ( 2021 07:10 )  PTT:28.6 sec                          Urinalysis Basic - ( 2021 14:50 )    Color: Yellow / Appearance: Clear / S.010 / pH: x  Gluc: x / Ketone: Negative  / Bili: Negative / Urobili: Negative   Blood: x / Protein: Negative / Nitrite: Negative   Leuk Esterase: Negative / RBC: 0-4 /HPF / WBC Negative /HPF   Sq Epi: x / Non Sq Epi: Neg.-Few / Bacteria: Negative /HPF        COVID-19 PCR: NotDetec (21 @ 15:30)      RADIOLOGY & ADDITIONAL TESTS:    < from: CT Abdomen and Pelvis w/ IV Cont (21 @ 14:45) >  IMPRESSION:  Acute uncomplicated appendicitis        --- End of Report ---              PHILLIP VILLALOBOS MD; Attending Radiologist  This document has been electronically signed. 2021  2:56PM    < end of copied text >      Care Discussed with Consultants/Other Providers:   
The patient feels "much better" this morning, as she no longer has any abdominal pain or nausea. She remains afebrile, and the WBC is now normal.      PAST MEDICAL & SURGICAL HISTORY:  No pertinent past medical history.  No pertinent surgical procedures.    PFSH: All noncontributory    MEDICATIONS REVIEWED:acetaminophen   Tablet .. 650 milliGRAM(s) Oral every 6 hours PRN  aluminum hydroxide/magnesium hydroxide/simethicone Suspension 30 milliLiter(s) Oral every 4 hours PRN  dextrose 5% + sodium chloride 0.9%. 1000 milliLiter(s) IV Continuous <Continuous>  melatonin 3 milliGRAM(s) Oral at bedtime PRN  ondansetron Injectable 4 milliGRAM(s) IV Push once  oxycodone    5 mG/acetaminophen 325 mG 1 Tablet(s) Oral every 8 hours PRN  piperacillin/tazobactam IVPB.. 3.375 Gram(s) IV Intermittent every 8 hours  traMADol 25 milliGRAM(s) Oral every 6 hours PRN      ALLERGIES:No Known Allergies      REVIEW OF SYSTEMS:  Comprehensive Review of Systems negative except as noted in HPI      PHYSICAL EXAMINATION:  RESPIRATORY: Clear to auscultation bilaterally, respirations non-labored.  CARDIAC: RR, normal S1S2, no murmurs.  ABDOMEN: soft, BS present, no masses, no hernias, no peritoneal signs, no KLS, nontender.  VASCULAR: Equal and normal pulses.  MUSCULOSKELETAL: Full ROM, no deformities.      T(C): 36.7 (07-19-21 @ 08:32), Max: 37.4 (07-19-21 @ 05:13)  HR: 69 (07-19-21 @ 08:32) (66 - 89)  BP: 125/72 (07-19-21 @ 08:32) (114/58 - 150/70)  RR: 14 (07-19-21 @ 08:32) (14 - 18)  SpO2: 97% (07-19-21 @ 08:32) (96% - 100%)                          11.3   7.36  )-----------( 158      ( 19 Jul 2021 07:10 )             33.8       07-19    140  |  105  |  11  ----------------------------<  97  3.7   |  29  |  0.67    Ca    8.2<L>      19 Jul 2021 07:10  Phos  3.0     07-19  Mg     2.1     07-19    TPro  6.7  /  Alb  3.1<L>  /  TBili  0.9  /  DBili  x   /  AST  23  /  ALT  27  /  AlkPhos  46  07-19

## 2021-07-19 NOTE — DISCHARGE NOTE PROVIDER - NSDCMRMEDTOKEN_GEN_ALL_CORE_FT
Augmentin 875 mg-125 mg oral tablet: 1 tab(s) orally 2 times a day   Augmentin 875 mg-125 mg oral tablet: 1 tab(s) orally 2 times a day

## 2021-07-19 NOTE — DISCHARGE NOTE NURSING/CASE MANAGEMENT/SOCIAL WORK - PATIENT PORTAL LINK FT
You can access the FollowMyHealth Patient Portal offered by [facility] by registering at the following website: [email or web address]. By joining [facility]’s FollowMyHealth portal, you will also be able to view your health information using other applications (apps) compatible with our system.

## 2021-07-19 NOTE — DISCHARGE NOTE PROVIDER - HOSPITAL COURSE
Hospital Course  BENNETT ARITA is a 81y year old Female with PMH of Hypertension who presents to the ER complaining of abdominal pain for 2 days.    Patient states she had pizza for dinner Friday night, that evening had abdominal distention and gas. The next morning, she took her temp and was 100, had abdominal pain at lower quadrants of abdomen. Associated with nausea and vomiting x3, NBNB. Denies fever, chills, chest pain, shortness of breath.    In ER, vitals stable. CT notable for acute appendicitis. Received 1L NS, Zosyn.     pt admitted to the hospital with acute uncomplicated appendicitis.  surgery evaluated patient, plan for non operative management.  started on Iv zosyn, clinically improved, tolerating clear liquid diet.  Cleared by surgery for discharge home on oral augemntin and outpatient follow up.      Source of Infection:  Antibiotic / Last Day: augmentin 875 BID for ten more days    Palliative Care / Advanced Care Planning  Code Status: Full code  Patient/Family agreeable to Hospice/Palliative (Y/N)?  Summary of Goals of Care Conversation:    Discharging Provider:  Bennett Garg NP  Contact Info: Cell [phone] - Please call with any questions or concerns.    Outpatient Provider:            Hospital Course  BENNETT ARITA is a 81y year old Female with PMH of Hypertension who presents to the ER complaining of abdominal pain for 2 days.  Patient states she had pizza for dinner Friday night, that evening had abdominal distention and gas. The next morning, she took her temp and was 100F, had abdominal pain at lower quadrants of abdomen. Associated with nausea and vomiting x3, NBNB. Denies fever, chills, chest pain, shortness of breath. In ER, vitals stable. CT notable for acute appendicitis. Received 1L NS, Zosyn.     Pt admitted to Telemetry for acute uncomplicated appendicitis.  Surgery evaluated patient, plan for non operative management. Started on IV zosyn, clinically improved, tolerating clear liquid diet.  Cleared by surgery for discharge home on oral augemntin and outpatient follow up.      Source of Infection:  Antibiotic / Last Day: augmentin 875 BID for ten more days    Palliative Care / Advanced Care Planning  Code Status: Full code  Patient/Family agreeable to Hospice/Palliative (Y/N)?  Summary of Goals of Care Conversation:    Discharging Provider:  Bennett Garg NP; Angelique Summers DO  Contact Info: Cell [phone] - Please call with any questions or concerns.    Outpatient Provider: Dr. Mike    Time Spent: 45 minutes

## 2021-07-19 NOTE — PROGRESS NOTE ADULT - ASSESSMENT
IMPRESSION: Acute, uncomplicated appendicitis - resolving    PLAN: No plans for surgery           Begin PO fluid               If diet tolerated, discharge after lunch  (PO Augmentin for nine days, appointment in two days, begin solid food tomorrow).
81 female with pmh htn presents to ED complaining of abdominal pain for two days admitted with acute appendicitis    # Acute Uncomplicated Appendicitis - resolving  CTAP reviewed  surgery consult appreciated  plan for conservative management  continue IV Zosyn  begin CLD, if tolerating diet discharge after lunch  begin solid food tomorrow  outpatient follow up with Dr. Bro (surgery)  analgesia, antiemetics prn    # hyponatremia   resolved s/p IVF    # HTN  chronic condition  resume home labetalol and norvasc at discharge    # dispo  dc home with outpatient surgery follow up if tolerating clears  on po abx      
Assessment:  BENNETT ARITA is a 81y year old Female with PMH of Hypertension who presents to the ER complaining of abdominal pain for 2 days. Found to have acute appendicitis     Problem List:  1. Acute appendicitis   2. Hyponatremia     Plan:  #Acute appendicitis   - Surgery team on board  - Patient to reevaluated in AM   - Started on IV Zosyn   - Patient prefers to have non surgical management   - Pain control   - Zofran prn nausea  - Hold off chemical DVT prophylaxis if patient to go to OR tomorrow, will c/w b/l SCDs  - Melatonin for sleep hygiene   - Tylenol for fevers     #Hyponatremia  - Likely 2/2 GI losses   - IVF w/D5 NS  - Trend renal function and electrolytes, replete as necessary

## 2021-07-19 NOTE — PROGRESS NOTE ADULT - ATTENDING COMMENTS
80 y/o F with PMH HTN c/o abd pain admitted for acute appendicitis, uncomplicated. surgery appreciated, advance diet to CLD and if tolerating, discharge home on CLD with slow advancement as tolerated. on IV zosyn, may discharge on PO augmentin to complete 10 days of abx. pt verbalizes understanding. follow up with sx Dr. Bro Wed or Fri. cont home meds, dvt ppx as indicated

## 2021-07-19 NOTE — DISCHARGE NOTE PROVIDER - CARE PROVIDER_API CALL
Juan Diego Bro)  ColonRectal Surgery; Surgery  [address], [address]  [place], [place] [id number]  Phone: [phone]  Fax: [phone]  Follow Up Time:

## 2021-07-19 NOTE — DISCHARGE NOTE PROVIDER - NSDCCPCAREPLAN_GEN_ALL_CORE_FT
PRINCIPAL DISCHARGE DIAGNOSIS  Diagnosis: Acute appendicitis, unspecified acute appendicitis type  Assessment and Plan of Treatment: you are medically and surgically cleared to go home on oral antibiotics and follow up with Dr. Bro next week

## 2021-07-19 NOTE — PROGRESS NOTE ADULT - TIME BILLING
Long discussion regarding all options and risks; all lab values and imaging studies reviewed; discussed with Medicine

## 2021-07-23 ENCOUNTER — APPOINTMENT (OUTPATIENT)
Dept: SURGERY | Facility: CLINIC | Age: 81
End: 2021-07-23
Payer: MEDICARE

## 2021-07-23 VITALS — TEMPERATURE: 96.6 F | BODY MASS INDEX: 27.05 KG/M2 | HEIGHT: 62 IN | WEIGHT: 147 LBS

## 2021-07-23 DIAGNOSIS — Z87.19 PERSONAL HISTORY OF OTHER DISEASES OF THE DIGESTIVE SYSTEM: ICD-10-CM

## 2021-07-23 DIAGNOSIS — Z86.39 PERSONAL HISTORY OF OTHER ENDOCRINE, NUTRITIONAL AND METABOLIC DISEASE: ICD-10-CM

## 2021-07-23 DIAGNOSIS — Z82.3 FAMILY HISTORY OF STROKE: ICD-10-CM

## 2021-07-23 DIAGNOSIS — Z82.49 FAMILY HISTORY OF ISCHEMIC HEART DISEASE AND OTHER DISEASES OF THE CIRCULATORY SYSTEM: ICD-10-CM

## 2021-07-23 PROCEDURE — 99214 OFFICE O/P EST MOD 30 MIN: CPT

## 2021-07-23 NOTE — PHYSICAL EXAM
[Normal Breath Sounds] : Normal breath sounds [Normal Heart Sounds] : normal heart sounds [Abdominal Masses] : No abdominal masses [Abdomen Tenderness] : ~T ~M No abdominal tenderness [No Rash or Lesion] : No rash or lesion [de-identified] : nl [de-identified] : nl [de-identified] : nl

## 2021-07-23 NOTE — REVIEW OF SYSTEMS
[Heart Rate Is Slow] : the heart rate was not slow [Abdominal Pain] : no abdominal pain [Constipation] : no constipation [Negative] : Respiratory

## 2021-07-23 NOTE — HISTORY OF PRESENT ILLNESS
[de-identified] : The patient is feeling well since her recent hospitalization for acute, nonoperative appendicitis. She denies any abdominal pain , fever or chills. The patient has been eating well and passing stool. She is still taking PO antibiotics

## 2021-07-23 NOTE — ASSESSMENT
[FreeTextEntry1] : Long discussion regarding all options and risks\par The patient still does not wish to undergo a LAP AP - even as an elective, future procedure\par  She desires only to return if the symptoms recur\par All lab values and imaging studies reviewed\par Discussed with Medicine

## 2021-12-02 ENCOUNTER — NON-APPOINTMENT (OUTPATIENT)
Age: 81
End: 2021-12-02

## 2021-12-02 ENCOUNTER — APPOINTMENT (OUTPATIENT)
Dept: CARDIOLOGY | Facility: CLINIC | Age: 81
End: 2021-12-02
Payer: MEDICARE

## 2021-12-02 VITALS
BODY MASS INDEX: 27.79 KG/M2 | RESPIRATION RATE: 16 BRPM | HEART RATE: 76 BPM | OXYGEN SATURATION: 98 % | DIASTOLIC BLOOD PRESSURE: 82 MMHG | HEIGHT: 62 IN | WEIGHT: 151 LBS | TEMPERATURE: 97 F | SYSTOLIC BLOOD PRESSURE: 187 MMHG

## 2021-12-02 VITALS — SYSTOLIC BLOOD PRESSURE: 170 MMHG | DIASTOLIC BLOOD PRESSURE: 84 MMHG

## 2021-12-02 PROCEDURE — 93000 ELECTROCARDIOGRAM COMPLETE: CPT

## 2021-12-02 PROCEDURE — 99213 OFFICE O/P EST LOW 20 MIN: CPT

## 2021-12-02 RX ORDER — ESCITALOPRAM OXALATE 5 MG/1
5 TABLET ORAL
Qty: 30 | Refills: 0 | Status: DISCONTINUED | COMMUNITY
Start: 2021-02-15 | End: 2021-12-02

## 2021-12-02 NOTE — HISTORY OF PRESENT ILLNESS
Phil
[FreeTextEntry1] : Follow-up visit.  Review of home blood pressure generally between the 120s and 130s systolic over 60s.  No chest pain shortness of breath flushing palpitations.\par \par

## 2021-12-02 NOTE — ASSESSMENT
[FreeTextEntry1] : [place] hypertension controlled on the outside.  Left bundle branch block chronic.

## 2021-12-02 NOTE — DISCUSSION/SUMMARY
[Patient] : the patient [Risks] : risks [Benefits] : benefits [Alternatives] : alternatives [___ Month(s)] : in [unfilled] month(s) [FreeTextEntry1] : Advised follow-up with PMD continue to monitor BP at home questions addressed with patient.

## 2021-12-02 NOTE — PHYSICAL EXAM
[Well Developed] : well developed [Well Nourished] : well nourished [No Acute Distress] : no acute distress [Normal Conjunctiva] : normal conjunctiva [Normal Venous Pressure] : normal venous pressure [No Carotid Bruit] : no carotid bruit [Normal S1, S2] : normal S1, S2 [No Rub] : no rub [No Gallop] : no gallop [Normal] : clear lung fields, good air entry, no respiratory distress [Clear Lung Fields] : clear lung fields [Good Air Entry] : good air entry [No Respiratory Distress] : no respiratory distress  [Soft] : abdomen soft [Non Tender] : non-tender [No Masses/organomegaly] : no masses/organomegaly [Normal Bowel Sounds] : normal bowel sounds [Normal Gait] : normal gait [No Edema] : no edema [No Cyanosis] : no cyanosis [No Clubbing] : no clubbing [No Varicosities] : no varicosities [No Rash] : no rash [No Skin Lesions] : no skin lesions [Moves all extremities] : moves all extremities [No Focal Deficits] : no focal deficits [Normal Speech] : normal speech [Alert and Oriented] : alert and oriented [Normal memory] : normal memory [de-identified] : Soft systolic murmur at base

## 2022-01-06 ENCOUNTER — INPATIENT (INPATIENT)
Facility: HOSPITAL | Age: 82
LOS: 0 days | Discharge: HOME | DRG: 394 | End: 2022-01-07
Attending: STUDENT IN AN ORGANIZED HEALTH CARE EDUCATION/TRAINING PROGRAM | Admitting: STUDENT IN AN ORGANIZED HEALTH CARE EDUCATION/TRAINING PROGRAM
Payer: COMMERCIAL

## 2022-01-06 VITALS
HEIGHT: 62 IN | SYSTOLIC BLOOD PRESSURE: 162 MMHG | WEIGHT: 147.93 LBS | OXYGEN SATURATION: 98 % | DIASTOLIC BLOOD PRESSURE: 71 MMHG | HEART RATE: 71 BPM | TEMPERATURE: 97 F | RESPIRATION RATE: 16 BRPM

## 2022-01-06 DIAGNOSIS — K35.80 UNSPECIFIED ACUTE APPENDICITIS: ICD-10-CM

## 2022-01-06 LAB
ALBUMIN SERPL ELPH-MCNC: 4 G/DL — SIGNIFICANT CHANGE UP (ref 3.3–5)
ALP SERPL-CCNC: 59 U/L — SIGNIFICANT CHANGE UP (ref 40–120)
ALT FLD-CCNC: 32 U/L — SIGNIFICANT CHANGE UP (ref 10–45)
ANION GAP SERPL CALC-SCNC: 9 MMOL/L — SIGNIFICANT CHANGE UP (ref 5–17)
APPEARANCE UR: CLEAR — SIGNIFICANT CHANGE UP
APTT BLD: 28.1 SEC — SIGNIFICANT CHANGE UP (ref 27.5–35.5)
AST SERPL-CCNC: 33 U/L — SIGNIFICANT CHANGE UP (ref 10–40)
BACTERIA # UR AUTO: ABNORMAL /HPF
BASOPHILS # BLD AUTO: 0.03 K/UL — SIGNIFICANT CHANGE UP (ref 0–0.2)
BASOPHILS NFR BLD AUTO: 0.3 % — SIGNIFICANT CHANGE UP (ref 0–2)
BILIRUB SERPL-MCNC: 1.5 MG/DL — HIGH (ref 0.2–1.2)
BILIRUB UR-MCNC: NEGATIVE — SIGNIFICANT CHANGE UP
BLD GP AB SCN SERPL QL: SIGNIFICANT CHANGE UP
BUN SERPL-MCNC: 14 MG/DL — SIGNIFICANT CHANGE UP (ref 7–23)
CALCIUM SERPL-MCNC: 9.3 MG/DL — SIGNIFICANT CHANGE UP (ref 8.4–10.5)
CHLORIDE SERPL-SCNC: 97 MMOL/L — SIGNIFICANT CHANGE UP (ref 96–108)
CO2 SERPL-SCNC: 27 MMOL/L — SIGNIFICANT CHANGE UP (ref 22–31)
COLOR SPEC: YELLOW — SIGNIFICANT CHANGE UP
CREAT SERPL-MCNC: 0.73 MG/DL — SIGNIFICANT CHANGE UP (ref 0.5–1.3)
DIFF PNL FLD: ABNORMAL
EOSINOPHIL # BLD AUTO: 0.01 K/UL — SIGNIFICANT CHANGE UP (ref 0–0.5)
EOSINOPHIL NFR BLD AUTO: 0.1 % — SIGNIFICANT CHANGE UP (ref 0–6)
EPI CELLS # UR: SIGNIFICANT CHANGE UP
GLUCOSE SERPL-MCNC: 102 MG/DL — HIGH (ref 70–99)
GLUCOSE UR QL: NEGATIVE — SIGNIFICANT CHANGE UP
HCT VFR BLD CALC: 37.2 % — SIGNIFICANT CHANGE UP (ref 34.5–45)
HGB BLD-MCNC: 12.6 G/DL — SIGNIFICANT CHANGE UP (ref 11.5–15.5)
IMM GRANULOCYTES NFR BLD AUTO: 0.5 % — SIGNIFICANT CHANGE UP (ref 0–1.5)
INR BLD: 1.17 RATIO — HIGH (ref 0.88–1.16)
KETONES UR-MCNC: NEGATIVE — SIGNIFICANT CHANGE UP
LEUKOCYTE ESTERASE UR-ACNC: NEGATIVE — SIGNIFICANT CHANGE UP
LIDOCAIN IGE QN: 98 U/L — SIGNIFICANT CHANGE UP (ref 73–393)
LYMPHOCYTES # BLD AUTO: 1.75 K/UL — SIGNIFICANT CHANGE UP (ref 1–3.3)
LYMPHOCYTES # BLD AUTO: 17.9 % — SIGNIFICANT CHANGE UP (ref 13–44)
MCHC RBC-ENTMCNC: 30.8 PG — SIGNIFICANT CHANGE UP (ref 27–34)
MCHC RBC-ENTMCNC: 33.9 GM/DL — SIGNIFICANT CHANGE UP (ref 32–36)
MCV RBC AUTO: 91 FL — SIGNIFICANT CHANGE UP (ref 80–100)
MONOCYTES # BLD AUTO: 0.58 K/UL — SIGNIFICANT CHANGE UP (ref 0–0.9)
MONOCYTES NFR BLD AUTO: 5.9 % — SIGNIFICANT CHANGE UP (ref 2–14)
NEUTROPHILS # BLD AUTO: 7.36 K/UL — SIGNIFICANT CHANGE UP (ref 1.8–7.4)
NEUTROPHILS NFR BLD AUTO: 75.3 % — SIGNIFICANT CHANGE UP (ref 43–77)
NITRITE UR-MCNC: NEGATIVE — SIGNIFICANT CHANGE UP
NRBC # BLD: 0 /100 WBCS — SIGNIFICANT CHANGE UP (ref 0–0)
PH UR: 6 — SIGNIFICANT CHANGE UP (ref 5–8)
PLATELET # BLD AUTO: 178 K/UL — SIGNIFICANT CHANGE UP (ref 150–400)
POTASSIUM SERPL-MCNC: 4.1 MMOL/L — SIGNIFICANT CHANGE UP (ref 3.5–5.3)
POTASSIUM SERPL-SCNC: 4.1 MMOL/L — SIGNIFICANT CHANGE UP (ref 3.5–5.3)
PROT SERPL-MCNC: 8 G/DL — SIGNIFICANT CHANGE UP (ref 6–8.3)
PROT UR-MCNC: NEGATIVE — SIGNIFICANT CHANGE UP
PROTHROM AB SERPL-ACNC: 14.1 SEC — HIGH (ref 10.6–13.6)
RBC # BLD: 4.09 M/UL — SIGNIFICANT CHANGE UP (ref 3.8–5.2)
RBC # FLD: 12.9 % — SIGNIFICANT CHANGE UP (ref 10.3–14.5)
RBC CASTS # UR COMP ASSIST: SIGNIFICANT CHANGE UP /HPF (ref 0–4)
SARS-COV-2 RNA SPEC QL NAA+PROBE: SIGNIFICANT CHANGE UP
SODIUM SERPL-SCNC: 133 MMOL/L — LOW (ref 135–145)
SP GR SPEC: 1.01 — SIGNIFICANT CHANGE UP (ref 1.01–1.02)
UROBILINOGEN FLD QL: NEGATIVE — SIGNIFICANT CHANGE UP
WBC # BLD: 9.78 K/UL — SIGNIFICANT CHANGE UP (ref 3.8–10.5)
WBC # FLD AUTO: 9.78 K/UL — SIGNIFICANT CHANGE UP (ref 3.8–10.5)
WBC UR QL: NEGATIVE /HPF — SIGNIFICANT CHANGE UP (ref 0–5)

## 2022-01-06 PROCEDURE — 99284 EMERGENCY DEPT VISIT MOD MDM: CPT

## 2022-01-06 PROCEDURE — 99233 SBSQ HOSP IP/OBS HIGH 50: CPT

## 2022-01-06 PROCEDURE — 71045 X-RAY EXAM CHEST 1 VIEW: CPT | Mod: 26

## 2022-01-06 PROCEDURE — 74177 CT ABD & PELVIS W/CONTRAST: CPT | Mod: 26,MA

## 2022-01-06 PROCEDURE — 99285 EMERGENCY DEPT VISIT HI MDM: CPT | Mod: FS

## 2022-01-06 RX ORDER — HEPARIN SODIUM 5000 [USP'U]/ML
5000 INJECTION INTRAVENOUS; SUBCUTANEOUS EVERY 8 HOURS
Refills: 0 | Status: DISCONTINUED | OUTPATIENT
Start: 2022-01-06 | End: 2022-01-07

## 2022-01-06 RX ORDER — LEVOTHYROXINE SODIUM 125 MCG
1 TABLET ORAL
Qty: 0 | Refills: 0 | DISCHARGE

## 2022-01-06 RX ORDER — SODIUM CHLORIDE 9 MG/ML
1000 INJECTION INTRAMUSCULAR; INTRAVENOUS; SUBCUTANEOUS
Refills: 0 | Status: DISCONTINUED | OUTPATIENT
Start: 2022-01-06 | End: 2022-01-07

## 2022-01-06 RX ORDER — PIPERACILLIN AND TAZOBACTAM 4; .5 G/20ML; G/20ML
3.38 INJECTION, POWDER, LYOPHILIZED, FOR SOLUTION INTRAVENOUS ONCE
Refills: 0 | Status: COMPLETED | OUTPATIENT
Start: 2022-01-06 | End: 2022-01-06

## 2022-01-06 RX ORDER — AMLODIPINE BESYLATE 2.5 MG/1
2.5 TABLET ORAL DAILY
Refills: 0 | Status: DISCONTINUED | OUTPATIENT
Start: 2022-01-06 | End: 2022-01-07

## 2022-01-06 RX ORDER — LABETALOL HCL 100 MG
50 TABLET ORAL
Refills: 0 | Status: DISCONTINUED | OUTPATIENT
Start: 2022-01-06 | End: 2022-01-07

## 2022-01-06 RX ORDER — SIMVASTATIN 20 MG/1
10 TABLET, FILM COATED ORAL AT BEDTIME
Refills: 0 | Status: DISCONTINUED | OUTPATIENT
Start: 2022-01-06 | End: 2022-01-07

## 2022-01-06 RX ORDER — SIMVASTATIN 20 MG/1
1 TABLET, FILM COATED ORAL
Qty: 0 | Refills: 0 | DISCHARGE

## 2022-01-06 RX ORDER — AMLODIPINE BESYLATE 2.5 MG/1
0 TABLET ORAL
Qty: 0 | Refills: 0 | DISCHARGE

## 2022-01-06 RX ORDER — ACETAMINOPHEN 500 MG
650 TABLET ORAL EVERY 6 HOURS
Refills: 0 | Status: DISCONTINUED | OUTPATIENT
Start: 2022-01-06 | End: 2022-01-07

## 2022-01-06 RX ORDER — SODIUM CHLORIDE 9 MG/ML
1000 INJECTION INTRAMUSCULAR; INTRAVENOUS; SUBCUTANEOUS ONCE
Refills: 0 | Status: COMPLETED | OUTPATIENT
Start: 2022-01-06 | End: 2022-01-06

## 2022-01-06 RX ORDER — LABETALOL HCL 100 MG
0.5 TABLET ORAL
Qty: 0 | Refills: 0 | DISCHARGE

## 2022-01-06 RX ORDER — PIPERACILLIN AND TAZOBACTAM 4; .5 G/20ML; G/20ML
3.38 INJECTION, POWDER, LYOPHILIZED, FOR SOLUTION INTRAVENOUS EVERY 8 HOURS
Refills: 0 | Status: DISCONTINUED | OUTPATIENT
Start: 2022-01-06 | End: 2022-01-07

## 2022-01-06 RX ORDER — SODIUM CHLORIDE 9 MG/ML
1000 INJECTION INTRAMUSCULAR; INTRAVENOUS; SUBCUTANEOUS ONCE
Refills: 0 | Status: DISCONTINUED | OUTPATIENT
Start: 2022-01-06 | End: 2022-01-06

## 2022-01-06 RX ORDER — LEVOTHYROXINE SODIUM 125 MCG
50 TABLET ORAL DAILY
Refills: 0 | Status: DISCONTINUED | OUTPATIENT
Start: 2022-01-06 | End: 2022-01-07

## 2022-01-06 RX ADMIN — SIMVASTATIN 10 MILLIGRAM(S): 20 TABLET, FILM COATED ORAL at 22:44

## 2022-01-06 RX ADMIN — Medication 50 MILLIGRAM(S): at 22:43

## 2022-01-06 RX ADMIN — SODIUM CHLORIDE 1000 MILLILITER(S): 9 INJECTION INTRAMUSCULAR; INTRAVENOUS; SUBCUTANEOUS at 11:53

## 2022-01-06 RX ADMIN — PIPERACILLIN AND TAZOBACTAM 3.38 GRAM(S): 4; .5 INJECTION, POWDER, LYOPHILIZED, FOR SOLUTION INTRAVENOUS at 15:30

## 2022-01-06 RX ADMIN — SODIUM CHLORIDE 1000 MILLILITER(S): 9 INJECTION INTRAMUSCULAR; INTRAVENOUS; SUBCUTANEOUS at 13:14

## 2022-01-06 RX ADMIN — SODIUM CHLORIDE 100 MILLILITER(S): 9 INJECTION INTRAMUSCULAR; INTRAVENOUS; SUBCUTANEOUS at 17:14

## 2022-01-06 RX ADMIN — PIPERACILLIN AND TAZOBACTAM 25 GRAM(S): 4; .5 INJECTION, POWDER, LYOPHILIZED, FOR SOLUTION INTRAVENOUS at 22:53

## 2022-01-06 RX ADMIN — PIPERACILLIN AND TAZOBACTAM 200 GRAM(S): 4; .5 INJECTION, POWDER, LYOPHILIZED, FOR SOLUTION INTRAVENOUS at 14:59

## 2022-01-06 NOTE — CONSULT NOTE ADULT - ASSESSMENT
IMPRESSION: Acute, uncomplicated appendicitis    PLAN: The patient desires to attempt nonoperative treatment again - despite the risk of perforation and recurrance           Monitor WBC and temp., abdominal examination           Evaluate in am           ALPESH, NPO after MNT

## 2022-01-06 NOTE — ED PROVIDER NOTE - CLINICAL SUMMARY MEDICAL DECISION MAKING FREE TEXT BOX
82 y/o F with PMH of HTN, hypothyroidism presents for lower abd pain x yesterday, localized to RLQ today. Pt was seen in 7/2021 for acute appendicitis, treated with abx by Dr. Sher. Reports she is concerned for appendicitis again. Denies n/v/d, f/c, urinary sympts, CP, SOB, prior abd surgeries or all other complaints. PE as noted above. labs/UA/CT pending, reassess 80 y/o F with PMH of HTN, hypothyroidism presents for lower abd pain x yesterday, localized to RLQ today. Pt was seen in 7/2021 for acute appendicitis, treated with abx by Dr. Sher. Reports she is concerned for appendicitis again. Denies n/v/d, f/c, urinary sympts, CP, SOB, prior abd surgeries or all other complaints. PE as noted above. labs/UA/CT pending, reassess    302pm: labs reviewed. UA results reviewed. Abd CT-- acute appendicitis. Spoke with hospitalist Dr. Summers. will start zosyn and admit

## 2022-01-06 NOTE — ED PROVIDER NOTE - OBJECTIVE STATEMENT
82 y/o F with PMH of HTN, hypothyroidism presents for lower abd pain x yesterday, localized to RLQ today. Pt was seen in 7/2021 for acute appendicitis, treated with abx by Dr. Sher. Reports she is concerned for appendicitis again. Denies n/v/d, f/c, urinary sympts, CP, SOB, prior abd surgeries or all other complaints

## 2022-01-06 NOTE — ED ADULT TRIAGE NOTE - HEIGHT IN INCHES
[facility] Office Visit Note    Subjective   Patient ID: Zee Perera is a 65 year old female.    Chief Complaint   Patient presents with   • Hospital F/U     [facility] on 06/19/2020 - 06/21/2020 for chest pain , feeling fatigue, trazodone is not working.     HPI  Admitted for DVT of right calf to [facility] on 6/19-6/21    DISCHARGE DIAGNOSES/HOSPITAL COURSE     Jaw and chest pain w/ palpitations                Troponin (-) x2               Recent Echo showed nml LVSF, MV prolapse, and mild-mod MV regurgitation                Stress test (6/20) noted tiny predominantly fixed defect in the anterior wall towards the apex likely related to               apical thinning or remote infarct; EF 62% at stress, 51% at rest              CTA PE (-) unremarkable               LDL elevated ~141 this admission              Discussed with cardiology (Dr. Hayes) followed - plan for holter monitor as outpatient to define burden of             PVCs     Acute lower extremity DVT             US Venous BLE showed acute deep vein thrombosis in the right peroneal vein              AC via therapeutic Lovenox 80mg sc , patient transition to Eliquis at discharge               CTA rule out PE               Patient does have history of lupus, also reports that she possibly had the COVID though was never tested.      Covid test was negative on admission.  Colonoscopy done 3 months ago.    Antibody testing completed in office 5/19/2020 and negative.       Anxiety/depression - Continuing duloxetine  Hypothyroidism - Continuing lelvothyroxine  Lupus - Continuing medications     DVT PPx: Eliquis     F/u with Dr. Hayes has not made a f/u yet, plan for Holter monitor as outpatient     Currently on the Eliquis: Reiterated dosing initially 7 days  F/u with Dr. Carey in September as scheduled  Increased the Trazadone dose as duscyssed  PFT scheduled for tomorrow    Patient's medications, allergies, past medical, surgical, social and family  histories were reviewed and updated as appropriate.    Review of Systems   Constitutional: Negative for activity change, appetite change, chills, fatigue and fever.   HENT: Negative for congestion, ear discharge, ear pain, mouth sores, postnasal drip, rhinorrhea and sinus pain.    Eyes: Negative for discharge, redness, itching and visual disturbance.   Respiratory: Negative for chest tightness, shortness of breath and wheezing.    Cardiovascular: Negative for chest pain.   Gastrointestinal: Negative for abdominal pain, diarrhea and nausea.   Endocrine: Negative for polydipsia, polyphagia and polyuria.   Genitourinary: Negative for difficulty urinating, flank pain and urgency.   Musculoskeletal: Negative for arthralgias and myalgias.   Neurological: Negative for dizziness, syncope and headaches.   Psychiatric/Behavioral: Positive for sleep disturbance. Negative for behavioral problems and confusion.     /78 (BP Location: LUE - Left upper extremity, Patient Position: Sitting, Cuff Size: Regular)   Pulse 83   Temp 96.6 °F (35.9 °C) (Temporal)   Ht 5' 7\" (1.702 m)   Wt 77.2 kg (170 lb 3.2 oz)   LMP  (LMP Unknown)   BMI 26.66 kg/m²   BSA 1.89 m²     Objective   Physical Exam   Constitutional: She is oriented to person, place, and time. Vital signs are normal. She appears well-developed and well-nourished. She is active and cooperative. She does not appear ill. No distress.   HENT:   Head: Normocephalic and atraumatic.   Right Ear: External ear normal.   Left Ear: External ear normal.   Nose: Nose normal.   Mouth/Throat: Oropharynx is clear and moist.   Eyes: Pupils are equal, round, and reactive to light. Conjunctivae and EOM are normal.   Neck: Normal range of motion. Neck supple.   Cardiovascular: Normal rate, regular rhythm, S1 normal, S2 normal and normal heart sounds.   No murmur heard.  Pulses:       Radial pulses are 2+ on the right side and 2+ on the left side.        Dorsalis pedis pulses are 2+ on  the right side and 2+ on the left side.   Pulmonary/Chest: Effort normal and breath sounds normal. No respiratory distress.   Abdominal: Soft. Bowel sounds are normal. She exhibits no distension. There is no abdominal tenderness.   Musculoskeletal: Normal range of motion.      Right lower leg: Normal.      Left lower leg: Normal.   Neurological: She is alert and oriented to person, place, and time.   Skin: Skin is warm and dry.   Psychiatric: She has a normal mood and affect. Her behavior is normal.   Nursing note and vitals reviewed.      Problem List Items Addressed This Visit        Behavioral    Depression with anxiety    Relevant Medications    Renewed ALPRAZolam (XANAX) 0.5 MG tablet       Circulatory    Acute deep vein thrombosis (DVT) of right peroneal vein       Other    Sleep disturbance    Relevant Medications    Increased dose trazodone (DESYREL) 150 MG tablet    Hospital discharge follow-up - Primary        Patient Instructions   Take medication as prescribed  Referral to cardiology is completed, f/u as scheduled  Discussed Eliquis dosing and use for the next 3-6 months, will defer duration to cardiology  F/u in 3-5 days if symptoms persist or sooner if worse   Medical compliance with plan discussed and risks of non-compliance reviewed.    Patient education completed on disease process, etiology & prognosis.    Patient expresses understanding of the plan.    Proper usage and side effects of medications reviewed & discussed.    Refer to orders.    Return to clinic as clinically indicated as discussed with patient who verbalized understanding of & agreement with the plan.     NAZIA Metzger              2

## 2022-01-06 NOTE — ED PROVIDER NOTE - NS ED MD DISPO DIVISION
[facility] Nephrology Daily Progress Note    Date of Service  1/17/2021    Chief Complaint  67 y.o. male with PMH of ESRD on HD MWF, HTN, anemia of CKD, and CKD-MBD  who presents to the emergency department complaining of shortness of breath, productive cough and malaise. It has become progressively worse over the last few days and today at dialysis he felt the SOB was increasing and was hypoxic with O2 saturation in the low 80's on room air and was therefore sent for further evaluation. The patient has tested positive for Covid-19 infection and chest X-ray today is concerning for bilateral infection/pneumonia and moderate pulmonary edema. He was most recently discharged from the hospital on 12/22/20 after being treated for large pericardial effusion and pericarditis.     Interval Problem Update  1/11 - Consult done  1/12 - RN/MD notes reviewed, HD yesterday with 2.6L UF, VSS on 1.5L NC, labs stable   1/13 - RN/MD notes reviewed, HD due today, iron stores OK, VSS on 2L NC  1/14 - RN/MD notes reviewed, HD yesterday with 3L UF, VSS on RA, DC planning underway  1/15 - RN/MD notes reviewed, due for HD today, VSS on RA, team working on safe DC plan  1/16 - No new overnight renal events. S/p HD yesterday with net UF of 1.8L UF and tolerated well.   1/17 - No new overnight renal events. Doing fine.    Review of Systems  ROS   Deferred due to Covid +    Physical Exam  Temp:  [36.3 °C (97.3 °F)-36.6 °C (97.8 °F)] 36.3 °C (97.3 °F)  Pulse:  [69-77] 77  Resp:  [12-18] 16  BP: ()/(59-69) 113/65  SpO2:  [96 %-97 %] 97 %    Physical Exam  Deferred due to Covid +    Fluids    Intake/Output Summary (Last 24 hours) at 1/17/2021 0807  Last data filed at 1/16/2021 1800  Gross per 24 hour   Intake 480 ml   Output --   Net 480 ml       Laboratory              No results for input(s): NTPROBNP in the last 72 hours.        Imaging  Available labs, imaging and clinical documentation reviewed.     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  - Normally dialyzes qMWF at [facility] via L AVF  - Living XPL surgery pending final work up at [facility]  # Acute hypoxic respiratory failure, resolved   # Covid-19 PNA  - On decadron   # Pulmonary edema, improved   - s/p dose of IV Lasix 1/11  - UF with HD   # HTN  - Goal BP < 140/90  - At goal  - On metoprolol   # Anemia of CKD, below target  - Goal Hgb 10-11  - Iron OK, ferritin 18,459  # CKD-MBD  - Managed at OP unit  - On sevelamer with meals   - On Sensipar   # Recent pericardial effusion/pericarditis  - On colchicine      PLAN:  - No plans for HD today.  - iHD qMWF. Next Treatment is tomorrow  - UF as tolerated  - No dietary protein restrictions  - Dose all meds per ESRD  - Low sodium renal diet with fluid restriction   - Covid-19 treatment per primary team   - SUSHIL with HD, goal Hgb 10-11  - Transfuse PRN Hgb <7   - Ok to transition to OP HD at [facility] in [place] once medically cleared  - Pt reports he has no housing so this is an issue for DC planning   - Follow labs    Thank you,               Emil Cove

## 2022-01-06 NOTE — H&P ADULT - NSICDXFAMILYHX_GEN_ALL_CORE_FT
FAMILY HISTORY:  Mother  Still living? No  FH: HTN (hypertension), Age at diagnosis: Age Unknown

## 2022-01-06 NOTE — H&P ADULT - ASSESSMENT
80yo female with hx of HTN, Hypothyroidism, presented with diffuse abdominal pain associated with diarrhea x 1 day, noted to have acute appendicitis without perforation     #Acute appendicitis  -2nd episode of flare up  -Surgery consulted, awaiting recommendations  -Maintain NPO  -IVF + Zosyn   -Monitor vitals   -Cardiology consulted to cardiac clearance     #HTN  -Continue Amlodipine/Labetalol    #Hypothyroidism  -Synthroid    #DVT ppx  HSQ 80yo female with hx of HTN, Hypothyroidism, presented with diffuse abdominal pain associated with diarrhea x 1 day, noted to have acute appendicitis without perforation     #Acute appendicitis  -2nd episode of flare up  -Discussed case with Surgery, Dr. Sher; Likely conservative management this time around again. Reassess in the AM  -CLD for now, NPO after MN  -IVF + Zosyn   -Monitor vitals   -Cardiology consulted to cardiac clearance     #HTN  -Continue Amlodipine/Labetalol    #Hypothyroidism  -Synthroid    #DVT ppx  HSQ

## 2022-01-06 NOTE — ED ADULT TRIAGE NOTE - NSWEIGHTCALCTOOLDRUG_GEN_A_CORE
used Complex Repair Preamble Text (Leave Blank If You Do Not Want): Extensive wide undermining was performed.

## 2022-01-06 NOTE — H&P ADULT - HISTORY OF PRESENT ILLNESS
82yo female with hx of HTN, Hypothyroidism, presented with diffuse abdominal pain associated with diarrhea x 1 day. Pt states she noted sudden sharp abdominal pain throughout her abdomen with multiple episodes of loose stools yesterday. Denied fever, chills, N/V. Pt states shes assumed she have viral gastroenteritis, however, recalled having episode of acute appendicitis 6 months and therefore decided to come to the ED. She currently states her pain has improved but is still present. She was diagnosed with acute appendicitis in June/July 2021 and was conservatively managed with abx. Her pain resolved as she recovered without any issues.

## 2022-01-06 NOTE — ED PROVIDER NOTE - ATTENDING CONTRIBUTION TO CARE
80 y/o F with PMH of HTN, hypothyroidism presents for lower abd pain x yesterday, localized to RLQ today. Pt was seen in 7/2021 for acute appendicitis, treated with abx by Dr. Sher. Reports she is concerned for appendicitis again. Denies n/v/d, f/c, urinary sympts, CP, SOB, prior abd surgeries or all other complaints. PE as noted above. labs/UA/CT pending, reassess    302pm: labs reviewed. UA results reviewed. Abd CT-- acute appendicitis. Spoke with hospitalist Dr. Summers. will start zosyn and admit  Dr. Valdes:  I have reviewed and discussed with the PA/ resident the case specifics, including the history, physical assessment, evaluation, conclusion, laboratory results, and medical plan. I agree with the contents, and conclusions. I have personally examined, and interviewed the patient.

## 2022-01-06 NOTE — CONSULT NOTE ADULT - SUBJECTIVE AND OBJECTIVE BOX
Chief Complaint:   80yo female with hx of HTN, Hypothyroidism, presented with diffuse abdominal pain associated with diarrhea x 1 day. Pt states she noted sudden sharp abdominal pain throughout her abdomen with multiple episodes of loose stools yesterday. Denied fever, chills, N/V. Pt states shes assumed she have viral gastroenteritis, however, recalled having episode of acute appendicitis 6 months and therefore decided to come to the ED. She currently states her pain has improved but is still present. She was diagnosed with acute appendicitis in June/July 2021 and was conservatively managed with abx. Her pain resolved as she recovered without any issues.  patient followed by dr Bowser and underwent a pheo workup with urine studies and renal artery stenosis eval which are unrevealing she was previosyly seen     HPI:    PMH:   HTN - Hypertension    Adult hypothyroidism    Hypercholesteremia      PSH:   Hypothyroid    Essential hypertension, benign    HTN - Hypertension    No significant past surgical history      Family History:  FAMILY HISTORY:  FH: HTN (hypertension) (Mother)        Social History:  Smoking:  Alcohol:  Drugs:    Allergies:  No Known Allergies      Medications:  acetaminophen     Tablet .. 650 milliGRAM(s) Oral every 6 hours PRN  amLODIPine   Tablet 2.5 milliGRAM(s) Oral daily  heparin   Injectable 5000 Unit(s) SubCutaneous every 8 hours  labetalol 50 milliGRAM(s) Oral two times a day  levothyroxine 50 MICROGram(s) Oral daily  piperacillin/tazobactam IVPB.. 3.375 Gram(s) IV Intermittent every 8 hours  simvastatin 10 milliGRAM(s) Oral at bedtime  sodium chloride 0.9%. 1000 milliLiter(s) IV Continuous <Continuous>      REVIEW OF SYSTEMS:  CONSTITUTIONAL: No fever, weight loss, or fatigue  EYES: No eye pain, visual disturbances, or discharge  ENMT:  No difficulty hearing, tinnitus, vertigo; No sinus or throat pain  NECK: No pain or stiffness  BREASTS: No pain, masses, or nipple discharge  RESPIRATORY: No cough, wheezing, chills or hemoptysis; No shortness of breath  CARDIOVASCULAR: No chest pain, palpitations, dizziness, or leg swelling  GASTROINTESTINAL: No abdominal or epigastric pain. No nausea, vomiting, or hematemesis; No diarrhea or constipation. No melena or hematochezia.  GENITOURINARY: No dysuria, frequency, hematuria, or incontinence  NEUROLOGICAL: No headaches, memory loss, loss of strength, numbness, or tremors  SKIN: No itching, burning, rashes, or lesions   LYMPH NODES: No enlarged glands  ENDOCRINE: No heat or cold intolerance; No hair loss  MUSCULOSKELETAL: No joint pain or swelling; No muscle, back, or extremity pain  PSYCHIATRIC: No depression, anxiety, mood swings, or difficulty sleeping  HEME/LYMPH: No easy bruising, or bleeding gums  ALLERY AND IMMUNOLOGIC: No hives or eczema    Physical Exam:  T(C): 36.3 (01-06-22 @ 11:14), Max: 36.3 (01-06-22 @ 11:14)  HR: 71 (01-06-22 @ 11:14) (71 - 71)  BP: 162/71 (01-06-22 @ 11:14) (162/71 - 162/71)  RR: 16 (01-06-22 @ 11:14) (16 - 16)  SpO2: 98% (01-06-22 @ 11:14) (98% - 98%)  Wt(kg): --    GENERAL: NAD, well-groomed, well-developed  HEAD:  Atraumatic, Normocephalic  EYES: EOMI, conjunctiva and sclera clear  ENT: Moist mucous membranes,  NECK: Supple, No JVD, no bruits  CHEST/LUNG: Clear to percussion bilaterally; No rales, rhonchi, wheezing, or rubs  HEART: Regular rate and rhythm; No murmurs, rubs, or gallops PMI non displaced.  ABDOMEN: Soft, Nontender, Nondistended; Bowel sounds present  EXTREMITIES:  2+ Peripheral Pulses, No clubbing, cyanosis, or edema  SKIN: No rashes or lesions  NERVOUS SYSTEM:  Cranial Nerves II-XII intact     Cardiovascular Diagnostic Testing:  ECG:    < from: 12 Lead ECG (01.06.22 @ 14:58) >  Diagnosis Line Normal sinus rhythm  Left axis deviation  Left bundle branch block  Abnormal ECG  No previous ECGs available      Confirmed by MICHELLE ROTH MD ([id number]) on 1/6/2022 4:49:01 PM    < end of copied text >      ECHO:  2021 lvh normal lv      Labs:                        12.6   9.78  )-----------( 178      ( 06 Jan 2022 11:50 )             37.2     01-06    133<L>  |  97  |  14  ----------------------------<  102<H>  4.1   |  27  |  0.73    Ca    9.3      06 Jan 2022 11:50    TPro  8.0  /  Alb  4.0  /  TBili  1.5<H>  /  DBili  x   /  AST  33  /  ALT  32  /  AlkPhos  59  01-06    PT/INR - ( 06 Jan 2022 14:45 )   PT: 14.1 sec;   INR: 1.17 ratio         PTT - ( 06 Jan 2022 14:45 )  PTT:28.1 sec      Imaging:    < from: CT Abdomen and Pelvis w/ IV Cont (01.06.22 @ 13:55) >  IMPRESSION: Acute appendicitis without perforation or abscess. Results   were discussed with provider Velma Ohara at 2:35 PM on 1/6/2022.  Large left lower pole renal cyst    --- End of Report ---      JESSE LEBLANC MD; Attending Radiologist  This document has been electronically signed. Jan 6 2022  2:38PM    < end of copied text >      impression  lbbb is chronic. recent echo preserved lv function discussed implications of LBBB including possible ischemic heart diseae.  and possible future evaluations with dr Bowser such as stress testing or cardiac cta.     ap  with out evidence for recent infarction overt heart failure or unstable angina there are no cardiac contraindications to surgery from cardiac standpoint if indicated patient may under general surgery at an ASA class II anesthesia risk. currently on antibiotic therapy   
This year old woman developed lower abdominal pain 24 hours ago. She stated that this was the same type of pain that she experienced six months ago when she was treated non-operatively for acute appendicitis. A CT scan today demonstrated mild uncomplicated appendicitis similar to six months ago. She was afebrile with a normal WBC.      PAST MEDICAL & SURGICAL HISTORY:  HTN  Hypothyroid    PFSH: All noncontributory    MEDICATIONS REVIEWED:acetaminophen     Tablet .. 650 milliGRAM(s) Oral every 6 hours PRN  amLODIPine   Tablet 2.5 milliGRAM(s) Oral daily  heparin   Injectable 5000 Unit(s) SubCutaneous every 8 hours  labetalol 50 milliGRAM(s) Oral two times a day  levothyroxine 50 MICROGram(s) Oral daily  piperacillin/tazobactam IVPB.. 3.375 Gram(s) IV Intermittent every 8 hours  simvastatin 10 milliGRAM(s) Oral at bedtime  sodium chloride 0.9%. 1000 milliLiter(s) IV Continuous <Continuous>      ALLERGIES:No Known Allergies      PHYSICAL EXAMINATION:  RESPIRATORY: Clear to auscultation bilaterally, respirations non-labored.  CARDIAC: RR, normal S1S2, no murmurs.  ABDOMEN: soft, BS present, no masses, no hernias, no peritoneal signs, no KLS, slight tenderness to deep tenderness in RLQ  VASCULAR: Equal and normal pulses.  MUSCULOSKELETAL: Full ROM, no deformities.      T(C): 37.5 (01-06-22 @ 18:57), Max: 37.5 (01-06-22 @ 18:57)  HR: 66 (01-06-22 @ 18:57) (66 - 71)  BP: 117/69 (01-06-22 @ 18:57) (117/69 - 162/71)  RR: 18 (01-06-22 @ 18:57) (16 - 18)  SpO2: 98% (01-06-22 @ 18:57) (98% - 98%)                          12.6   9.78  )-----------( 178      ( 06 Jan 2022 11:50 )             37.2       01-06    133<L>  |  97  |  14  ----------------------------<  102<H>  4.1   |  27  |  0.73    Ca    9.3      06 Jan 2022 11:50    TPro  8.0  /  Alb  4.0  /  TBili  1.5<H>  /  DBili  x   /  AST  33  /  ALT  32  /  AlkPhos  59  01-06

## 2022-01-06 NOTE — ED ADULT TRIAGE NOTE - CHIEF COMPLAINT QUOTE
I have pain on the rigth side of my abdomen for 2 days and am worried it may be my appendix. I had an episode this year they treated with antibiotics. ISAR positive

## 2022-01-06 NOTE — H&P ADULT - SKIN/BREAST
negative Mixed Nodular And Infiltrative Bcc Histology Text: Histologic features of both nodular (Discrete nests/nodules of malignant basaloid tumor is present within the dermis and/or attached to the epidermis. The tumor demonstrates palisading and is retracted away from a surrounding mucoid stroma) and infiltrative (Thin cords of basaloid tumor with angulated ends (“spiky islands”) composed of few to many basaloid keratinocytes, embedded in a myxoid to sclerotic stroma. Retraction of the tumor islands away from the surrounding stroma is variable) basal cell carcinoma are present.

## 2022-01-07 ENCOUNTER — TRANSCRIPTION ENCOUNTER (OUTPATIENT)
Age: 82
End: 2022-01-07

## 2022-01-07 VITALS
RESPIRATION RATE: 18 BRPM | SYSTOLIC BLOOD PRESSURE: 119 MMHG | TEMPERATURE: 98 F | DIASTOLIC BLOOD PRESSURE: 64 MMHG | HEART RATE: 70 BPM | OXYGEN SATURATION: 98 %

## 2022-01-07 LAB
ALBUMIN SERPL ELPH-MCNC: 3.4 G/DL — SIGNIFICANT CHANGE UP (ref 3.3–5)
ALP SERPL-CCNC: 42 U/L — SIGNIFICANT CHANGE UP (ref 40–120)
ALT FLD-CCNC: 32 U/L — SIGNIFICANT CHANGE UP (ref 10–45)
ANION GAP SERPL CALC-SCNC: 9 MMOL/L — SIGNIFICANT CHANGE UP (ref 5–17)
AST SERPL-CCNC: 25 U/L — SIGNIFICANT CHANGE UP (ref 10–40)
BASOPHILS # BLD AUTO: 0.03 K/UL — SIGNIFICANT CHANGE UP (ref 0–0.2)
BASOPHILS NFR BLD AUTO: 0.5 % — SIGNIFICANT CHANGE UP (ref 0–2)
BILIRUB SERPL-MCNC: 1.2 MG/DL — SIGNIFICANT CHANGE UP (ref 0.2–1.2)
BUN SERPL-MCNC: 9 MG/DL — SIGNIFICANT CHANGE UP (ref 7–23)
CALCIUM SERPL-MCNC: 8.7 MG/DL — SIGNIFICANT CHANGE UP (ref 8.4–10.5)
CHLORIDE SERPL-SCNC: 106 MMOL/L — SIGNIFICANT CHANGE UP (ref 96–108)
CO2 SERPL-SCNC: 26 MMOL/L — SIGNIFICANT CHANGE UP (ref 22–31)
CREAT SERPL-MCNC: 0.66 MG/DL — SIGNIFICANT CHANGE UP (ref 0.5–1.3)
EOSINOPHIL # BLD AUTO: 0.05 K/UL — SIGNIFICANT CHANGE UP (ref 0–0.5)
EOSINOPHIL NFR BLD AUTO: 0.9 % — SIGNIFICANT CHANGE UP (ref 0–6)
GLUCOSE SERPL-MCNC: 88 MG/DL — SIGNIFICANT CHANGE UP (ref 70–99)
HCT VFR BLD CALC: 32.9 % — LOW (ref 34.5–45)
HGB BLD-MCNC: 10.9 G/DL — LOW (ref 11.5–15.5)
IMM GRANULOCYTES NFR BLD AUTO: 0.4 % — SIGNIFICANT CHANGE UP (ref 0–1.5)
LYMPHOCYTES # BLD AUTO: 1.51 K/UL — SIGNIFICANT CHANGE UP (ref 1–3.3)
LYMPHOCYTES # BLD AUTO: 27 % — SIGNIFICANT CHANGE UP (ref 13–44)
MCHC RBC-ENTMCNC: 29.9 PG — SIGNIFICANT CHANGE UP (ref 27–34)
MCHC RBC-ENTMCNC: 33.1 GM/DL — SIGNIFICANT CHANGE UP (ref 32–36)
MCV RBC AUTO: 90.1 FL — SIGNIFICANT CHANGE UP (ref 80–100)
MONOCYTES # BLD AUTO: 0.52 K/UL — SIGNIFICANT CHANGE UP (ref 0–0.9)
MONOCYTES NFR BLD AUTO: 9.3 % — SIGNIFICANT CHANGE UP (ref 2–14)
NEUTROPHILS # BLD AUTO: 3.47 K/UL — SIGNIFICANT CHANGE UP (ref 1.8–7.4)
NEUTROPHILS NFR BLD AUTO: 61.9 % — SIGNIFICANT CHANGE UP (ref 43–77)
NRBC # BLD: 0 /100 WBCS — SIGNIFICANT CHANGE UP (ref 0–0)
PLATELET # BLD AUTO: 159 K/UL — SIGNIFICANT CHANGE UP (ref 150–400)
POTASSIUM SERPL-MCNC: 3.8 MMOL/L — SIGNIFICANT CHANGE UP (ref 3.5–5.3)
POTASSIUM SERPL-SCNC: 3.8 MMOL/L — SIGNIFICANT CHANGE UP (ref 3.5–5.3)
PROT SERPL-MCNC: 6.9 G/DL — SIGNIFICANT CHANGE UP (ref 6–8.3)
RBC # BLD: 3.65 M/UL — LOW (ref 3.8–5.2)
RBC # FLD: 13.1 % — SIGNIFICANT CHANGE UP (ref 10.3–14.5)
SODIUM SERPL-SCNC: 141 MMOL/L — SIGNIFICANT CHANGE UP (ref 135–145)
WBC # BLD: 5.6 K/UL — SIGNIFICANT CHANGE UP (ref 3.8–10.5)
WBC # FLD AUTO: 5.6 K/UL — SIGNIFICANT CHANGE UP (ref 3.8–10.5)

## 2022-01-07 PROCEDURE — 86900 BLOOD TYPING SEROLOGIC ABO: CPT

## 2022-01-07 PROCEDURE — 71045 X-RAY EXAM CHEST 1 VIEW: CPT

## 2022-01-07 PROCEDURE — 87635 SARS-COV-2 COVID-19 AMP PRB: CPT

## 2022-01-07 PROCEDURE — 85025 COMPLETE CBC W/AUTO DIFF WBC: CPT

## 2022-01-07 PROCEDURE — 74177 CT ABD & PELVIS W/CONTRAST: CPT | Mod: MA

## 2022-01-07 PROCEDURE — 85610 PROTHROMBIN TIME: CPT

## 2022-01-07 PROCEDURE — 99232 SBSQ HOSP IP/OBS MODERATE 35: CPT

## 2022-01-07 PROCEDURE — 99239 HOSP IP/OBS DSCHRG MGMT >30: CPT

## 2022-01-07 PROCEDURE — 36415 COLL VENOUS BLD VENIPUNCTURE: CPT

## 2022-01-07 PROCEDURE — 93005 ELECTROCARDIOGRAM TRACING: CPT

## 2022-01-07 PROCEDURE — 86850 RBC ANTIBODY SCREEN: CPT

## 2022-01-07 PROCEDURE — 96360 HYDRATION IV INFUSION INIT: CPT

## 2022-01-07 PROCEDURE — 85730 THROMBOPLASTIN TIME PARTIAL: CPT

## 2022-01-07 PROCEDURE — 83690 ASSAY OF LIPASE: CPT

## 2022-01-07 PROCEDURE — 81001 URINALYSIS AUTO W/SCOPE: CPT

## 2022-01-07 PROCEDURE — 80053 COMPREHEN METABOLIC PANEL: CPT

## 2022-01-07 PROCEDURE — 86901 BLOOD TYPING SEROLOGIC RH(D): CPT

## 2022-01-07 PROCEDURE — 96361 HYDRATE IV INFUSION ADD-ON: CPT

## 2022-01-07 PROCEDURE — 99285 EMERGENCY DEPT VISIT HI MDM: CPT | Mod: 25

## 2022-01-07 RX ADMIN — SODIUM CHLORIDE 100 MILLILITER(S): 9 INJECTION INTRAMUSCULAR; INTRAVENOUS; SUBCUTANEOUS at 03:23

## 2022-01-07 RX ADMIN — Medication 50 MILLIGRAM(S): at 11:49

## 2022-01-07 RX ADMIN — PIPERACILLIN AND TAZOBACTAM 25 GRAM(S): 4; .5 INJECTION, POWDER, LYOPHILIZED, FOR SOLUTION INTRAVENOUS at 05:36

## 2022-01-07 RX ADMIN — AMLODIPINE BESYLATE 2.5 MILLIGRAM(S): 2.5 TABLET ORAL at 11:49

## 2022-01-07 NOTE — DISCHARGE NOTE PROVIDER - CARE PROVIDER_API CALL
Juan Diego rBo)  ColonRectal Surgery; Surgery  [address], [address]  [place], [place] [id number]  Phone: [phone]  Fax: [phone]  Follow Up Time:

## 2022-01-07 NOTE — DISCHARGE NOTE PROVIDER - HOSPITAL COURSE
Hospital Course  HPI:  80yo female with hx of HTN, Hypothyroidism, presented with diffuse abdominal pain associated with diarrhea x 1 day. Pt states she noted sudden sharp abdominal pain throughout her abdomen with multiple episodes of loose stools yesterday. Denied fever, chills, N/V. Pt states shes assumed she have viral gastroenteritis, however, recalled having episode of acute appendicitis 6 months and therefore decided to come to the ED. She currently states her pain has improved but is still present. She was diagnosed with acute appendicitis in June/July 2021 and was conservatively managed with abx. Her pain resolved as she recovered without any issues.   (06 Jan 2022 15:53)    You were admitted for acute appendicitis.  You were treated with IV antibiotics.   You were prescribed the following new medications: Augmentin 875 mg twice a day for 7 more days.    You will need to follow up with your primary care physician and Dr. Bro (general surgery).    Source of Infection:  Appendicitis  Antibiotic / Last Day: Augmentin 875 mg twice a day for 9 days    Discharging Provider:  Girish Candelaria MD  Contact Info: Cell [phone] - Please call with any questions or concerns.    Outpatient Provider:  Dr. Mike - notified

## 2022-01-07 NOTE — PROGRESS NOTE ADULT - SUBJECTIVE AND OBJECTIVE BOX
Patient is a 81y old  Female who presents with a chief complaint of abdominal pain (2022 08:14)    improved abdominal pain.      Patient seen and examined at bedside. No overnight events reported.     ALLERGIES:  No Known Allergies    MEDICATIONS  (STANDING):  amLODIPine   Tablet 2.5 milliGRAM(s) Oral daily  heparin   Injectable 5000 Unit(s) SubCutaneous every 8 hours  labetalol 50 milliGRAM(s) Oral two times a day  levothyroxine 50 MICROGram(s) Oral daily  piperacillin/tazobactam IVPB.. 3.375 Gram(s) IV Intermittent every 8 hours  simvastatin 10 milliGRAM(s) Oral at bedtime  sodium chloride 0.9%. 1000 milliLiter(s) (100 mL/Hr) IV Continuous <Continuous>    MEDICATIONS  (PRN):  acetaminophen     Tablet .. 650 milliGRAM(s) Oral every 6 hours PRN Temp greater or equal to 38C (100.4F), Mild Pain (1 - 3)    Vital Signs Last 24 Hrs  T(F): 98.8 (2022 05:50), Max: 99.5 (2022 18:57)  HR: 63 (2022 05:50) (63 - 71)  BP: 123/68 (2022 05:50) (117/69 - 162/71)  RR: 18 (2022 05:50) (16 - 18)  SpO2: 96% (2022 05:50) (96% - 98%)  I&O's Summary    PHYSICAL EXAM:  General: NAD, A/O x 3  ENT: No gross hearing impairment, Moist mucous membranes, no thrush  Neck: Supple, No JVD  Lungs: Clear to auscultation bilaterally, good air entry, non-labored breathing  Cardio: RRR, S1/S2, No murmur  Abdomen: Soft, Nontender, Nondistended; Bowel sounds present  Extremities: No calf tenderness, No cyanosis, No pitting edema  Psych: Appropriate mood and affect    LABS:                        10.9   5.60  )-----------( 159      ( 2022 07:07 )             32.9     01-06    133  |  97  |  14  ----------------------------<  102  4.1   |  27  |  0.73    Ca    9.3      2022 11:50    TPro  8.0  /  Alb  4.0  /  TBili  1.5  /  DBili  x   /  AST  33  /  ALT  32  /  AlkPhos  59        Lipase, Serum: 98 U/L (22 @ 11:50)    eGFR if Non African American: 78 mL/min/1.73M2 (22 @ 11:50)    PT/INR - ( 2022 14:45 )   PT: 14.1 sec;   INR: 1.17 ratio      PTT - ( 2022 14:45 )  PTT:28.1 sec    Urinalysis Basic - ( 2022 11:55 )    Color: Yellow / Appearance: Clear / S.010 / pH: x  Gluc: x / Ketone: Negative  / Bili: Negative / Urobili: Negative   Blood: x / Protein: Negative / Nitrite: Negative   Leuk Esterase: Negative / RBC: 0-4 /HPF / WBC Negative /HPF   Sq Epi: x / Non Sq Epi: Neg.-Few / Bacteria: Trace /HPF    COVID-19 PCR: NotDetec (22 @ 14:45)    RADIOLOGY & ADDITIONAL TESTS:    Care Discussed with Consultants/Other Providers: Yes - Dr. Bro   
Although the patient is still experiencing RLQ "discomfort", but it is much improved since yesterday. She denies nausea, vomiting, fever or chills. The patient remains afebrile with a normal WBC.     PFSH: All noncontributory    MEDICATIONS REVIEWED:acetaminophen     Tablet .. 650 milliGRAM(s) Oral every 6 hours PRN  amLODIPine   Tablet 2.5 milliGRAM(s) Oral daily  heparin   Injectable 5000 Unit(s) SubCutaneous every 8 hours  labetalol 50 milliGRAM(s) Oral two times a day  levothyroxine 50 MICROGram(s) Oral daily  piperacillin/tazobactam IVPB.. 3.375 Gram(s) IV Intermittent every 8 hours  simvastatin 10 milliGRAM(s) Oral at bedtime  sodium chloride 0.9%. 1000 milliLiter(s) IV Continuous <Continuous>      ALLERGIES:No Known Allergies      REVIEW OF SYSTEMS:  Comprehensive Review of Systems negative except as noted in HPI      PHYSICAL EXAMINATION:  RESPIRATORY: Clear to auscultation bilaterally, respirations non-labored.  CARDIAC: RR, normal S1S2, no murmurs.  ABDOMEN: soft, BS present, no masses, no hernias, no peritoneal signs, no KLS, slight tenderness to deep palpation in RLQ  VASCULAR: Equal and normal pulses.  MUSCULOSKELETAL: Full ROM, no deformities.      T(C): 37.1 (01-07-22 @ 05:50), Max: 37.5 (01-06-22 @ 18:57)  HR: 63 (01-07-22 @ 05:50) (63 - 71)  BP: 123/68 (01-07-22 @ 05:50) (117/69 - 162/71)  RR: 18 (01-07-22 @ 05:50) (16 - 18)  SpO2: 96% (01-07-22 @ 05:50) (96% - 98%)                          10.9   5.60  )-----------( 159      ( 07 Jan 2022 07:07 )             32.9       01-06    133<L>  |  97  |  14  ----------------------------<  102<H>  4.1   |  27  |  0.73    Ca    9.3      06 Jan 2022 11:50    TPro  8.0  /  Alb  4.0  /  TBili  1.5<H>  /  DBili  x   /  AST  33  /  ALT  32  /  AlkPhos  59  01-06

## 2022-01-07 NOTE — DISCHARGE NOTE PROVIDER - NSDCMRMEDTOKEN_GEN_ALL_CORE_FT
amLODIPine 2.5 mg oral tablet: orally 2 times a day  amoxicillin-clavulanate 875 mg-125 mg oral tablet: 1 tab(s) orally 2 times a day   labetalol 100 mg oral tablet: 0.5 tab(s) orally 2 times a day  levothyroxine 50 mcg (0.05 mg) oral capsule: 1 cap(s) orally once a day  simvastatin 10 mg oral tablet: 1 tab(s) orally once a day (at bedtime)

## 2022-01-07 NOTE — DISCHARGE NOTE PROVIDER - NSDCCPCAREPLAN_GEN_ALL_CORE_FT
PRINCIPAL DISCHARGE DIAGNOSIS  Diagnosis: Acute appendicitis  Assessment and Plan of Treatment: You were admitted for acute appendicitis.  You were treated with IV antibiotics.   You were prescribed the following new medications: Augmentin 875 mg twice a day for 7 more days.  You will need to follow up with your primary care physician and Dr. Bro (general surgery).

## 2022-01-07 NOTE — ED ADULT NURSE REASSESSMENT NOTE - NS ED NURSE REASSESS COMMENT FT1
Pt refused all morning meds. Pt stated that she is taking her own meds she brought from home and not the hospital meds. Pt educated on risks of possible overdose or overmedicating. Nursing supervisor notified. Supervisor spoke to patient. Pt did not take any PO meds. All meds rescheduled in accordance with the times the pt says she takes her meds at home.

## 2022-01-07 NOTE — ED ADULT NURSE REASSESSMENT NOTE - NS ED NURSE REASSESS COMMENT FT1
Patient A&O x4, ambulatory, continent. NPO overnight.  No pain or distress noted. Pt slept well for most of the night. IV abx administered as ordered. safety maintained.  Emotional support provided.  call bell within reach.  Will continue to monitor closely.

## 2022-01-07 NOTE — DISCHARGE NOTE PROVIDER - CARE PROVIDERS DIRECT ADDRESSES
[email or web address]

## 2022-01-07 NOTE — DISCHARGE NOTE NURSING/CASE MANAGEMENT/SOCIAL WORK - NSDCPEFALRISK_GEN_ALL_CORE
For information on Fall & Injury Prevention, visit: [email or web address] OR  [email or web address] OR  https://www.cdc.gov/steadi/patient.html

## 2022-01-07 NOTE — DISCHARGE NOTE NURSING/CASE MANAGEMENT/SOCIAL WORK - PATIENT PORTAL LINK FT
You can access the FollowMyHealth Patient Portal offered by [facility] by registering at the following website: [email or web address]. By joining [employer]’s FollowMyHealth portal, you will also be able to view your health information using other applications (apps) compatible with our system.

## 2022-01-07 NOTE — PROGRESS NOTE ADULT - ASSESSMENT
IMPRESSION: Acute, uncomplicated appendicitis    PLAN: The patient still does not want any surgery at this time. She is agreeable to a LAP AP in three weeks as an outpatient procedure!            Resume clear fluid diet            If tolerates PO fluids, can be discharged after lunch            To begin regular diet tomorrow            Appointment in my office next week            Augmentin for one week
82yo female with hx of HTN, Hypothyroidism, presented with diffuse abdominal pain associated with diarrhea x 1 day, noted to have acute appendicitis without perforation     Acute appendicitis  - improved abdominal pain  - patient declining surgery at this time  - likely d/c if tolerates lunch and then follow up with Dr. Bro and likely LAP appendectomy in future     HTN  -Continue Amlodipine/Labetalol    Hypothyroidism  -Synthroid    #DVT ppx  HSQ    d/c after lunch if tolerates diet

## 2022-01-07 NOTE — PROGRESS NOTE ADULT - TIME BILLING
Discharge Planning.
Long discussion regarding all options and risks; all lab values and imaging studies reviewed; discussed with Medicine

## 2022-01-31 RX ORDER — AMLODIPINE BESYLATE 2.5 MG/1
2.5 TABLET ORAL
Refills: 0 | Status: DISCONTINUED | COMMUNITY
End: 2022-01-31

## 2022-06-13 PROBLEM — E78.00 PURE HYPERCHOLESTEROLEMIA, UNSPECIFIED: Chronic | Status: ACTIVE | Noted: 2022-01-06

## 2022-06-22 ENCOUNTER — APPOINTMENT (OUTPATIENT)
Dept: OBGYN | Facility: CLINIC | Age: 82
End: 2022-06-22
Payer: MEDICARE

## 2022-06-22 VITALS
BODY MASS INDEX: 27.79 KG/M2 | HEART RATE: 75 BPM | OXYGEN SATURATION: 99 % | WEIGHT: 151 LBS | HEIGHT: 62 IN | DIASTOLIC BLOOD PRESSURE: 80 MMHG | TEMPERATURE: 98 F | SYSTOLIC BLOOD PRESSURE: 140 MMHG

## 2022-06-22 DIAGNOSIS — N89.8 OTHER SPECIFIED NONINFLAMMATORY DISORDERS OF VAGINA: ICD-10-CM

## 2022-06-22 DIAGNOSIS — Z01.419 ENCOUNTER FOR GYNECOLOGICAL EXAMINATION (GENERAL) (ROUTINE) W/OUT ABNORMAL FINDINGS: ICD-10-CM

## 2022-06-22 PROCEDURE — G0101: CPT

## 2022-06-22 NOTE — PLAN
[FreeTextEntry1] : \par \par I spent the time noted on the day of this patient encounter preparing for, providing and documenting the above E/M service and counseling and educate patient on differential, workup, disease course, and treatment/management. Education was provided to the patient during this encounter. All questions and concerns were answered and addressed in detail.\par \par Alicia Schmidt MD\par

## 2022-06-22 NOTE — PHYSICAL EXAM
[Chaperone Present] : A chaperone was present in the examining room during all aspects of the physical examination [Appropriately responsive] : appropriately responsive [Alert] : alert [No Acute Distress] : no acute distress [Soft] : soft [Non-tender] : non-tender [Non-distended] : non-distended [No HSM] : No HSM [No Lesions] : no lesions [No Mass] : no mass [Oriented x3] : oriented x3 [Examination Of The Breasts] : a normal appearance [No Masses] : no breast masses were palpable [Labia Majora] : normal [Labia Minora] : normal [Normal] : normal [Absent] : absent [Uterine Adnexae] : normal

## 2022-06-22 NOTE — HISTORY OF PRESENT ILLNESS
[FreeTextEntry1] : 82 yo P1 with presents today for well woman exam. 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

## 2022-06-28 DIAGNOSIS — N76.0 ACUTE VAGINITIS: ICD-10-CM

## 2022-06-28 DIAGNOSIS — B96.89 ACUTE VAGINITIS: ICD-10-CM

## 2022-06-28 LAB
C TRACH RRNA SPEC QL NAA+PROBE: NOT DETECTED
CANDIDA VAG CYTO: NOT DETECTED
CYTOLOGY CVX/VAG DOC THIN PREP: ABNORMAL
G VAGINALIS+PREV SP MTYP VAG QL MICRO: DETECTED
HPV HIGH+LOW RISK DNA PNL CVX: NOT DETECTED
N GONORRHOEA RRNA SPEC QL NAA+PROBE: NOT DETECTED
SOURCE AMPLIFICATION: NORMAL
T VAGINALIS VAG QL WET PREP: NOT DETECTED

## 2022-08-10 ENCOUNTER — APPOINTMENT (OUTPATIENT)
Dept: OBGYN | Facility: CLINIC | Age: 82
End: 2022-08-10

## 2022-08-10 VITALS
DIASTOLIC BLOOD PRESSURE: 78 MMHG | OXYGEN SATURATION: 98 % | WEIGHT: 151 LBS | BODY MASS INDEX: 27.79 KG/M2 | HEART RATE: 70 BPM | TEMPERATURE: 97.6 F | HEIGHT: 62 IN | SYSTOLIC BLOOD PRESSURE: 140 MMHG

## 2022-08-10 DIAGNOSIS — L70.0 ACNE VULGARIS: ICD-10-CM

## 2022-08-10 PROCEDURE — 57061 DESTRUCTION VAG LESIONS SMPL: CPT

## 2022-08-10 PROCEDURE — 99214 OFFICE O/P EST MOD 30 MIN: CPT | Mod: 25

## 2022-08-10 NOTE — PHYSICAL EXAM
[Chaperone Present] : A chaperone was present in the examining room during all aspects of the physical examination [Appropriately responsive] : appropriately responsive [Alert] : alert [No Acute Distress] : no acute distress [Oriented x3] : oriented x3 [Labia Majora] : normal [Labia Minora] : normal [Normal] :  normal [FreeTextEntry1] : arya noted on lab majora (L)

## 2022-08-10 NOTE — PROCEDURE
[I & D] : I&D [Time out performed] : Pre-procedure time out performed.  Patient's name, date of birth and procedure confirmed [Left] : left [____% Lidocaine w/Epi] : [unfilled]% Lidocaine with Epi [Tolerated Well] : The patient tolerated the procedure well [No Complications] : There were no complications [de-identified] : comedone extracted

## 2022-09-01 ENCOUNTER — NON-APPOINTMENT (OUTPATIENT)
Age: 82
End: 2022-09-01

## 2022-09-01 ENCOUNTER — APPOINTMENT (OUTPATIENT)
Dept: CARDIOLOGY | Facility: CLINIC | Age: 82
End: 2022-09-01

## 2022-09-01 VITALS
HEART RATE: 65 BPM | RESPIRATION RATE: 17 BRPM | OXYGEN SATURATION: 100 % | WEIGHT: 142 LBS | SYSTOLIC BLOOD PRESSURE: 150 MMHG | HEIGHT: 62 IN | DIASTOLIC BLOOD PRESSURE: 74 MMHG | BODY MASS INDEX: 26.13 KG/M2

## 2022-09-01 DIAGNOSIS — I44.7 LEFT BUNDLE-BRANCH BLOCK, UNSPECIFIED: ICD-10-CM

## 2022-09-01 PROCEDURE — 99214 OFFICE O/P EST MOD 30 MIN: CPT

## 2022-09-01 PROCEDURE — 93306 TTE W/DOPPLER COMPLETE: CPT

## 2022-09-01 PROCEDURE — 93000 ELECTROCARDIOGRAM COMPLETE: CPT

## 2022-09-01 RX ORDER — LABETALOL HYDROCHLORIDE 100 MG/1
100 TABLET, FILM COATED ORAL TWICE DAILY
Qty: 90 | Refills: 3 | Status: DISCONTINUED | COMMUNITY
Start: 2021-01-18 | End: 2022-09-01

## 2022-09-01 RX ORDER — AMLODIPINE BESYLATE 2.5 MG/1
2.5 TABLET ORAL
Qty: 60 | Refills: 3 | Status: DISCONTINUED | COMMUNITY
Start: 2020-08-10 | End: 2022-09-01

## 2022-09-01 NOTE — ASSESSMENT
[FreeTextEntry1] : Intermittent left bundle branch block.  Hypertension.  Postural intolerance likely from medication

## 2022-09-01 NOTE — PHYSICAL EXAM
[Well Developed] : well developed [Well Nourished] : well nourished [No Acute Distress] : no acute distress [Normal Conjunctiva] : normal conjunctiva [Normal Venous Pressure] : normal venous pressure [No Carotid Bruit] : no carotid bruit [Normal S1, S2] : normal S1, S2 [No Rub] : no rub [No Gallop] : no gallop [Normal] : clear lung fields, good air entry, no respiratory distress [Clear Lung Fields] : clear lung fields [Good Air Entry] : good air entry [No Respiratory Distress] : no respiratory distress  [Soft] : abdomen soft [Non Tender] : non-tender [No Masses/organomegaly] : no masses/organomegaly [Normal Bowel Sounds] : normal bowel sounds [Normal Gait] : normal gait [No Edema] : no edema [No Cyanosis] : no cyanosis [No Clubbing] : no clubbing [No Varicosities] : no varicosities [No Rash] : no rash [No Skin Lesions] : no skin lesions [Moves all extremities] : moves all extremities [No Focal Deficits] : no focal deficits [Normal Speech] : normal speech [Alert and Oriented] : alert and oriented [Normal memory] : normal memory [de-identified] : Soft systolic murmur at base

## 2022-09-01 NOTE — HISTORY OF PRESENT ILLNESS
[FreeTextEntry1] : \par Reports had interval appendicitis treated medically.  \par \par Was found to have LBBB evaluated at PMDs office.\par \par She indicates she thinks labetalol works for blood pressure control however she has significant postural intolerance difficulty getting up after sitting or lying.  Home blood pressures reviewed typically in the 1 10-1 20 range but sometimes in the 90s systolic range pulse rates in the 60s in general.\par

## 2022-09-01 NOTE — DISCUSSION/SUMMARY
[Patient] : the patient [Risks] : risks [Benefits] : benefits [Alternatives] : alternatives [___ Month(s)] : in [unfilled] month(s) [FreeTextEntry1] : Will taper off labetalol over next 2 weeks discussed with her.  She has been taking 2.5 once a day of amlodipine so we will increase to 5 mg once a day.  Echo to assess LV function.  Option of CTA and nuclear stress discussed she prefers CTA to assess for ischemic disease.

## 2022-09-01 NOTE — REASON FOR VISIT
[Arrhythmia/ECG Abnorrmalities] : arrhythmia/ECG abnormalities [Hypertension] : hypertension [FreeTextEntry3] : Cee

## 2022-09-01 NOTE — CARDIOLOGY SUMMARY
[de-identified] : 12/2/2021 sinus rhythm LBBB\par September 1, 2022 sinus rhythm LBBB [de-identified] : 2021 normal LV function

## 2022-09-05 ENCOUNTER — NON-APPOINTMENT (OUTPATIENT)
Age: 82
End: 2022-09-05

## 2022-09-06 LAB — CORE LAB BIOPSY: NORMAL

## 2022-10-27 ENCOUNTER — OUTPATIENT (OUTPATIENT)
Dept: OUTPATIENT SERVICES | Facility: HOSPITAL | Age: 82
LOS: 1 days | End: 2022-10-27
Payer: MEDICARE

## 2022-10-27 ENCOUNTER — APPOINTMENT (OUTPATIENT)
Dept: CT IMAGING | Facility: CLINIC | Age: 82
End: 2022-10-27

## 2022-10-27 DIAGNOSIS — I44.7 LEFT BUNDLE-BRANCH BLOCK, UNSPECIFIED: ICD-10-CM

## 2022-10-27 PROCEDURE — 75574 CT ANGIO HRT W/3D IMAGE: CPT

## 2022-10-27 PROCEDURE — 75574 CT ANGIO HRT W/3D IMAGE: CPT | Mod: 26,MH

## 2022-11-10 ENCOUNTER — RX RENEWAL (OUTPATIENT)
Age: 82
End: 2022-11-10

## 2023-04-20 ENCOUNTER — NON-APPOINTMENT (OUTPATIENT)
Age: 83
End: 2023-04-20

## 2023-04-20 ENCOUNTER — APPOINTMENT (OUTPATIENT)
Dept: CARDIOLOGY | Facility: CLINIC | Age: 83
End: 2023-04-20
Payer: MEDICARE

## 2023-04-20 VITALS — DIASTOLIC BLOOD PRESSURE: 84 MMHG | SYSTOLIC BLOOD PRESSURE: 142 MMHG

## 2023-04-20 VITALS
BODY MASS INDEX: 26.13 KG/M2 | DIASTOLIC BLOOD PRESSURE: 70 MMHG | HEART RATE: 86 BPM | HEIGHT: 62 IN | SYSTOLIC BLOOD PRESSURE: 162 MMHG | OXYGEN SATURATION: 100 % | TEMPERATURE: 97.6 F | WEIGHT: 142 LBS

## 2023-04-20 DIAGNOSIS — I25.10 ATHEROSCLEROTIC HEART DISEASE OF NATIVE CORONARY ARTERY W/OUT ANGINA PECTORIS: ICD-10-CM

## 2023-04-20 DIAGNOSIS — G25.0 ESSENTIAL TREMOR: ICD-10-CM

## 2023-04-20 DIAGNOSIS — H83.2X9 LABYRINTHINE DYSFUNCTION, UNSPECIFIED EAR: ICD-10-CM

## 2023-04-20 PROCEDURE — 99214 OFFICE O/P EST MOD 30 MIN: CPT

## 2023-04-20 PROCEDURE — 93000 ELECTROCARDIOGRAM COMPLETE: CPT

## 2023-04-20 RX ORDER — AMLODIPINE BESYLATE 2.5 MG/1
2.5 TABLET ORAL
Refills: 0 | Status: ACTIVE | COMMUNITY

## 2023-04-20 RX ORDER — AMLODIPINE BESYLATE 5 MG/1
5 TABLET ORAL DAILY
Qty: 30 | Refills: 3 | Status: DISCONTINUED | COMMUNITY
Start: 2022-09-01 | End: 2023-04-20

## 2023-04-20 NOTE — DISCUSSION/SUMMARY
[Patient] : the patient [Risks] : risks [Benefits] : benefits [Alternatives] : alternatives [FreeTextEntry1] : Discussed results of EKG echo and CT scanning.  Discussed beta-blocker usage and potential side effects.  Advised to have blood testing and maintain LDLs approximately 70 if possible continue her simvastatin and follow-up with PMD.  Continue amlodipine.  Multiple questions addressed with patient.  Follow-up with me 6 months or as needed [EKG obtained to assist in diagnosis and management of assessed problem(s)] : EKG obtained to assist in diagnosis and management of assessed problem(s)

## 2023-04-20 NOTE — PHYSICAL EXAM
[Well Developed] : well developed [Well Nourished] : well nourished [No Acute Distress] : no acute distress [Normal Conjunctiva] : normal conjunctiva [Normal Venous Pressure] : normal venous pressure [No Carotid Bruit] : no carotid bruit [Normal S1, S2] : normal S1, S2 [No Rub] : no rub [No Gallop] : no gallop [Normal] : clear lung fields, good air entry, no respiratory distress [Clear Lung Fields] : clear lung fields [Good Air Entry] : good air entry [No Respiratory Distress] : no respiratory distress  [No Edema] : no edema [No Cyanosis] : no cyanosis [No Clubbing] : no clubbing [No Rash] : no rash [No Skin Lesions] : no skin lesions [Moves all extremities] : moves all extremities [No Focal Deficits] : no focal deficits [Normal Speech] : normal speech [Alert and Oriented] : alert and oriented [Normal memory] : normal memory [de-identified] : Soft systolic murmur at base

## 2023-04-20 NOTE — CARDIOLOGY SUMMARY
[de-identified] : 12/2/2021 sinus rhythm LBBB\par September 1, 2022 sinus rhythm LBBB\par April 20, 2023 sinus rhythm LBBB [de-identified] : 2021 normal LV function\par 2022 normal LV function [de-identified] : 2022 nonobstructive LAD disease

## 2023-04-20 NOTE — HISTORY OF PRESENT ILLNESS
[FreeTextEntry1] : Was on labetalol but had issues of balance.  She has had ENT evaluation and neurologic evaluation.  Was told of vestibular dysfunction.  She discontinued nadolol and is now on amlodipine 2.5 mg Home blood pressures typically 120/80.  Is concerned regarding tremor and is being tried intermittently on Inderal.  She expresses concern regarding heart rate and blood pressure effects of beta-blocker.\par \par She denies chest pain shortness of breath or palpitation.

## 2023-04-20 NOTE — ASSESSMENT
[FreeTextEntry1] : Intermittent left bundle branch block.  Hypertension.   Coronary atherosclerosis without stenosis by CTA\par \par Tremor under care of neurologist

## 2023-08-18 ENCOUNTER — RX RENEWAL (OUTPATIENT)
Age: 83
End: 2023-08-18

## 2023-08-18 RX ORDER — SIMVASTATIN 10 MG/1
10 TABLET, FILM COATED ORAL
Qty: 90 | Refills: 0 | Status: ACTIVE | COMMUNITY
Start: 2020-08-14 | End: 1900-01-01

## 2023-09-20 ENCOUNTER — EMERGENCY (EMERGENCY)
Facility: HOSPITAL | Age: 83
LOS: 1 days | Discharge: HOME | End: 2023-09-20
Attending: STUDENT IN AN ORGANIZED HEALTH CARE EDUCATION/TRAINING PROGRAM | Admitting: STUDENT IN AN ORGANIZED HEALTH CARE EDUCATION/TRAINING PROGRAM
Payer: MEDICARE

## 2023-09-20 VITALS
TEMPERATURE: 98 F | DIASTOLIC BLOOD PRESSURE: 68 MMHG | RESPIRATION RATE: 18 BRPM | OXYGEN SATURATION: 98 % | HEART RATE: 82 BPM | SYSTOLIC BLOOD PRESSURE: 132 MMHG

## 2023-09-20 VITALS
RESPIRATION RATE: 16 BRPM | HEART RATE: 86 BPM | DIASTOLIC BLOOD PRESSURE: 77 MMHG | HEIGHT: 61 IN | TEMPERATURE: 98 F | SYSTOLIC BLOOD PRESSURE: 144 MMHG | WEIGHT: 147.93 LBS | OXYGEN SATURATION: 97 %

## 2023-09-20 DIAGNOSIS — M51.26 OTHER INTERVERTEBRAL DISC DISPLACEMENT, LUMBAR REGION: Chronic | ICD-10-CM

## 2023-09-20 PROCEDURE — 72125 CT NECK SPINE W/O DYE: CPT | Mod: 26,MA

## 2023-09-20 PROCEDURE — 70450 CT HEAD/BRAIN W/O DYE: CPT | Mod: 26,MA

## 2023-09-20 PROCEDURE — 99284 EMERGENCY DEPT VISIT MOD MDM: CPT | Mod: FS

## 2023-09-20 PROCEDURE — 70450 CT HEAD/BRAIN W/O DYE: CPT | Mod: MA

## 2023-09-20 PROCEDURE — 99284 EMERGENCY DEPT VISIT MOD MDM: CPT | Mod: 25

## 2023-09-20 PROCEDURE — 72125 CT NECK SPINE W/O DYE: CPT | Mod: MA

## 2023-09-20 RX ORDER — ACETAMINOPHEN 500 MG
650 TABLET ORAL ONCE
Refills: 0 | Status: COMPLETED | OUTPATIENT
Start: 2023-09-20 | End: 2023-09-20

## 2023-09-20 RX ADMIN — Medication 650 MILLIGRAM(S): at 10:57

## 2023-09-20 RX ADMIN — Medication 650 MILLIGRAM(S): at 11:57

## 2023-09-20 NOTE — ED PROVIDER NOTE - OBJECTIVE STATEMENT
82 y/o female w pmhx of hypercholesterolemia, hypothyroid, HTN, and essential tremor presents s/p fall X 4am yesterday morning. She states she was running to put on her slippers and slipped and hit the left side of her head against a cabinet. Has been having a mild pressure like headache on L side since then, radiating to the left side of her neck. Has pain with movement of neck. Has not taken anything for pain. Denies fever, chills, dizziness, SOB, chest pain, LOC, injuries to other body parts, visual changes, n/v. 84 y/o female w pmhx of hypercholesterolemia, hypothyroid, HTN, and essential tremor presents s/p fall X 4am yesterday morning. She states she was running to put on her slippers and slipped and hit the left side of her head against a cabinet. Has been having a mild pressure like headache on L side since then, radiating to the left side of her neck. Has not taken anything for pain. Denies fever, chills, dizziness, SOB, chest pain, LOC, injuries to other body parts, visual changes, n/v. No blood thinners.

## 2023-09-20 NOTE — ED PROVIDER NOTE - MUSCULOSKELETAL MINIMAL EXAM
TTP left side of neck, pain with ROM of neck/RANGE OF MOTION LIMITED/TENDERNESS TTP left side of neck/RANGE OF MOTION LIMITED/TENDERNESS

## 2023-09-20 NOTE — ED ADULT TRIAGE NOTE - CHIEF COMPLAINT QUOTE
S/p fall yesterday. +head strike, c/o pain to left side of head. Denies LOC, no thinners. Denies dizziness, cp, sob.

## 2023-09-20 NOTE — ED PROVIDER NOTE - CLINICAL SUMMARY MEDICAL DECISION MAKING FREE TEXT BOX
84 y/o female w pmhx of hypercholesterolemia, hypothyroid, HTN, and essential tremor presents s/p fall X 4am yesterday morning. She states she was running to put on her slippers and slipped and hit the left side of her head against a cabinet. Has been having a mild pressure like headache on L side since then, radiating to the left side of her neck. Has not taken anything for pain. Denies fever, chills, dizziness, SOB, chest pain, LOC, injuries to other body parts, visual changes, n/v. No blood thinners.     well appearing, clear lungs, abdomen soft non tender, mild cervical midline tenderness, mild tenderness, TTP in left temporal and parietal region, neurologically intact   CT head, c-spine ordered 82 y/o female w pmhx of hypercholesterolemia, hypothyroid, HTN, and essential tremor presents s/p fall X 4am yesterday morning. She states she was running to put on her slippers and slipped and hit the left side of her head against a cabinet. Has been having a mild pressure like headache on L side since then, radiating to the left side of her neck. Has not taken anything for pain. Denies fever, chills, dizziness, SOB, chest pain, LOC, injuries to other body parts, visual changes, n/v. No blood thinners.     well appearing, clear lungs, abdomen soft non tender, mild cervical midline tenderness, mild tenderness, TTP in left temporal and parietal region, neurologically intact   CT head, c-spine ordered  no acute traumatic injuries  f/u pcp  return precautions

## 2023-09-20 NOTE — ED PROVIDER NOTE - PATIENT PORTAL LINK FT
You can access the FollowMyHealth Patient Portal offered by [facility] by registering at the following website: [email or web address]. By joining [employer]’s FollowMyHealth portal, you will also be able to view your health information using other applications (apps) compatible with our system.

## 2023-09-20 NOTE — ED ADULT NURSE NOTE - OBJECTIVE STATEMENT
Patient BIB self  for fall at 0400 yesterday AMl. Patient was running to put on slippers and fell hitting left side of head against a cabinet. Mild pressure like headache left side. Denies dizziness, SOB. No thinners. Alert and oriented, HRR, respirations even and unlabored

## 2023-09-20 NOTE — ED PROVIDER NOTE - NS ED ATTENDING STATEMENT MOD
This was a shared visit with the HITESH. I reviewed and verified the documentation and independently performed the documented:

## 2023-09-22 ENCOUNTER — EMERGENCY (EMERGENCY)
Facility: HOSPITAL | Age: 83
LOS: 1 days | Discharge: LEFT BEFORE DISCHARGE | End: 2023-09-22
Admitting: EMERGENCY MEDICINE
Payer: MEDICARE

## 2023-09-22 PROCEDURE — L9992: CPT

## 2023-09-22 NOTE — ED ADULT NURSE NOTE - EXPLANATION OF PATIENT'S REASON FOR LEAVING
Pt entered triage stating she would call her neurologist for an MRI appointment instead of being seen in the ED. Pt offered a medical screening and declined.

## 2023-09-22 NOTE — CHART NOTE - NSCHARTNOTEFT_GEN_A_CORE
SW placed call to patient to discuss and assist with follow up care.  Patient presented to ED on 9/20/23 due to fall.  Patient declined SW assistance with scheduling follow up appointment at this time.  Patient encouraged to call ED SW if further assistance is needed.

## 2023-10-09 NOTE — ED PROVIDER NOTE - NS ED MD DISPO DISCHARGE
Home Minoxidil Pregnancy And Lactation Text: This medication has not been assigned a Pregnancy Risk Category but animal studies failed to show danger with the topical medication. It is unknown if the medication is excreted in breast milk.

## 2023-12-21 ENCOUNTER — NON-APPOINTMENT (OUTPATIENT)
Age: 83
End: 2023-12-21

## 2023-12-21 ENCOUNTER — APPOINTMENT (OUTPATIENT)
Dept: CARDIOLOGY | Facility: CLINIC | Age: 83
End: 2023-12-21
Payer: MEDICARE

## 2023-12-21 VITALS
OXYGEN SATURATION: 99 % | RESPIRATION RATE: 18 BRPM | DIASTOLIC BLOOD PRESSURE: 77 MMHG | HEART RATE: 59 BPM | SYSTOLIC BLOOD PRESSURE: 157 MMHG | TEMPERATURE: 94.6 F | HEIGHT: 62 IN | WEIGHT: 142 LBS | BODY MASS INDEX: 26.13 KG/M2

## 2023-12-21 DIAGNOSIS — Z86.73 PERSONAL HISTORY OF TRANSIENT ISCHEMIC ATTACK (TIA), AND CEREBRAL INFARCTION W/OUT RESIDUAL DEFICITS: ICD-10-CM

## 2023-12-21 DIAGNOSIS — I10 ESSENTIAL (PRIMARY) HYPERTENSION: ICD-10-CM

## 2023-12-21 PROCEDURE — 99214 OFFICE O/P EST MOD 30 MIN: CPT

## 2023-12-21 PROCEDURE — 93000 ELECTROCARDIOGRAM COMPLETE: CPT

## 2023-12-21 RX ORDER — PROPRANOLOL HCL 10 MG
10 TABLET ORAL
Refills: 0 | Status: ACTIVE | COMMUNITY

## 2023-12-21 RX ORDER — METRONIDAZOLE 7.5 MG/G
0.75 GEL VAGINAL
Qty: 1 | Refills: 0 | Status: COMPLETED | COMMUNITY
Start: 2022-06-28 | End: 2023-12-21

## 2023-12-21 RX ORDER — ESTRADIOL 10 UG/1
10 TABLET, FILM COATED VAGINAL
Qty: 3 | Refills: 1 | Status: COMPLETED | COMMUNITY
Start: 2022-06-22 | End: 2023-12-21

## 2023-12-21 NOTE — REASON FOR VISIT
[Arrhythmia/ECG Abnorrmalities] : arrhythmia/ECG abnormalities [Hypertension] : hypertension [FreeTextEntry3] : Cee [FreeTextEntry1] : Indicates under care of Dr. Rosenstein neurologist was given small dose of Inderal for tremor.  Home blood pressures reviewed typically in the 120s systolic.  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  She has no chest pain shortness of breath or palpitations.  No lightheadedness.  Does have loss of balance and tremor.

## 2023-12-21 NOTE — PHYSICAL EXAM
[Well Developed] : well developed [Well Nourished] : well nourished [No Acute Distress] : no acute distress [Normal Conjunctiva] : normal conjunctiva [Normal Venous Pressure] : normal venous pressure [No Carotid Bruit] : no carotid bruit [Normal S1, S2] : normal S1, S2 [No Rub] : no rub [No Gallop] : no gallop [Normal] : clear lung fields, good air entry, no respiratory distress [Clear Lung Fields] : clear lung fields [Good Air Entry] : good air entry [No Respiratory Distress] : no respiratory distress  [No Edema] : no edema [No Cyanosis] : no cyanosis [Moves all extremities] : moves all extremities [Normal Speech] : normal speech [Alert and Oriented] : alert and oriented [Normal memory] : normal memory [de-identified] : Soft systolic murmur at base

## 2023-12-21 NOTE — DISCUSSION/SUMMARY
[Patient] : the patient [Risks] : risks [Benefits] : benefits [Alternatives] : alternatives [___ Month(s)] : in [unfilled] month(s) [FreeTextEntry1] : Advised to continue her amlodipine and statin periodic blood testing with her PMD neurologic follow-up.  Multiple questions addressed with patient. [EKG obtained to assist in diagnosis and management of assessed problem(s)] : EKG obtained to assist in diagnosis and management of assessed problem(s)

## 2023-12-21 NOTE — ASSESSMENT
[FreeTextEntry1] : Chronic LBBB.  Elevated pressure today Home blood pressures normal.  Borderline bradycardia likely from beta-blocker.  Nonobstructive coronary atherosclerosis by CTA asymptomatic

## 2023-12-21 NOTE — CARDIOLOGY SUMMARY
[de-identified] : 12/2/2021 sinus rhythm LBBB September 1, 2022 sinus rhythm LBBB April 20, 2023 sinus rhythm LBBB December 21, 2023 sinus rhythm see LBBB [de-identified] : 2021 normal LV function\par  2022 normal LV function [de-identified] : 2022 nonobstructive LAD disease

## 2023-12-21 NOTE — RESULTS/DATA
[TextEntry] : You are welcome from all over   Blood testing via Dr. Rosenstein reviewed scanned into chart

## 2024-01-01 NOTE — ED ADULT NURSE NOTE - NSIMPLEMENTINTERV_GEN_ALL_ED
no Implemented All Universal Safety Interventions:  [place] to call system. Call bell, personal items and telephone within reach. Instruct patient to call for assistance. Room bathroom lighting operational. Non-slip footwear when patient is off stretcher. Physically safe environment: no spills, clutter or unnecessary equipment. Stretcher in lowest position, wheels locked, appropriate side rails in place.

## 2024-06-06 ENCOUNTER — APPOINTMENT (OUTPATIENT)
Dept: NEUROLOGY | Facility: CLINIC | Age: 84
End: 2024-06-06

## 2024-08-09 ENCOUNTER — APPOINTMENT (OUTPATIENT)
Dept: NEUROLOGY | Facility: CLINIC | Age: 84
End: 2024-08-09

## 2024-08-15 ENCOUNTER — APPOINTMENT (OUTPATIENT)
Dept: ORTHOPEDIC SURGERY | Facility: CLINIC | Age: 84
End: 2024-08-15
Payer: MEDICARE

## 2024-08-15 VITALS — HEIGHT: 61 IN | WEIGHT: 158 LBS | BODY MASS INDEX: 29.83 KG/M2

## 2024-08-15 DIAGNOSIS — M12.811 OTHER SPECIFIC ARTHROPATHIES, NOT ELSEWHERE CLASSIFIED, RIGHT SHOULDER: ICD-10-CM

## 2024-08-15 PROCEDURE — 71100 X-RAY EXAM RIBS UNI 2 VIEWS: CPT | Mod: RT

## 2024-08-15 PROCEDURE — 99204 OFFICE O/P NEW MOD 45 MIN: CPT

## 2024-08-15 PROCEDURE — 73030 X-RAY EXAM OF SHOULDER: CPT | Mod: RT

## 2025-01-12 ENCOUNTER — NON-APPOINTMENT (OUTPATIENT)
Age: 85
End: 2025-01-12